# Patient Record
Sex: FEMALE | NOT HISPANIC OR LATINO | URBAN - METROPOLITAN AREA
[De-identification: names, ages, dates, MRNs, and addresses within clinical notes are randomized per-mention and may not be internally consistent; named-entity substitution may affect disease eponyms.]

---

## 2023-02-26 ENCOUNTER — OUTPATIENT (OUTPATIENT)
Dept: OUTPATIENT SERVICES | Facility: HOSPITAL | Age: 33
LOS: 1 days | End: 2023-02-26
Payer: COMMERCIAL

## 2023-02-26 DIAGNOSIS — Z11.52 ENCOUNTER FOR SCREENING FOR COVID-19: ICD-10-CM

## 2023-02-26 LAB — SARS-COV-2 RNA SPEC QL NAA+PROBE: SIGNIFICANT CHANGE UP

## 2023-02-26 PROCEDURE — C9803: CPT

## 2023-02-26 PROCEDURE — U0005: CPT

## 2023-02-26 PROCEDURE — U0003: CPT

## 2023-02-28 ENCOUNTER — INPATIENT (INPATIENT)
Facility: HOSPITAL | Age: 33
LOS: 2 days | Discharge: HOME | End: 2023-03-03
Attending: OBSTETRICS & GYNECOLOGY | Admitting: OBSTETRICS & GYNECOLOGY
Payer: COMMERCIAL

## 2023-02-28 VITALS
SYSTOLIC BLOOD PRESSURE: 135 MMHG | WEIGHT: 199.96 LBS | TEMPERATURE: 99 F | OXYGEN SATURATION: 97 % | HEART RATE: 82 BPM | DIASTOLIC BLOOD PRESSURE: 85 MMHG | RESPIRATION RATE: 17 BRPM | HEIGHT: 64 IN

## 2023-02-28 DIAGNOSIS — O48.0 POST-TERM PREGNANCY: ICD-10-CM

## 2023-02-28 LAB
BASOPHILS # BLD AUTO: 0.03 K/UL — SIGNIFICANT CHANGE UP (ref 0–0.2)
BASOPHILS NFR BLD AUTO: 0.3 % — SIGNIFICANT CHANGE UP (ref 0–2)
EOSINOPHIL # BLD AUTO: 0.09 K/UL — SIGNIFICANT CHANGE UP (ref 0–0.5)
EOSINOPHIL NFR BLD AUTO: 0.9 % — SIGNIFICANT CHANGE UP (ref 0–6)
HCT VFR BLD CALC: 40.2 % — SIGNIFICANT CHANGE UP (ref 34.5–45)
HGB BLD-MCNC: 13.6 G/DL — SIGNIFICANT CHANGE UP (ref 11.5–15.5)
IMM GRANULOCYTES NFR BLD AUTO: 0.4 % — SIGNIFICANT CHANGE UP (ref 0–0.9)
LYMPHOCYTES # BLD AUTO: 2.26 K/UL — SIGNIFICANT CHANGE UP (ref 1–3.3)
LYMPHOCYTES # BLD AUTO: 22 % — SIGNIFICANT CHANGE UP (ref 13–44)
MCHC RBC-ENTMCNC: 29.8 PG — SIGNIFICANT CHANGE UP (ref 27–34)
MCHC RBC-ENTMCNC: 33.8 GM/DL — SIGNIFICANT CHANGE UP (ref 32–36)
MCV RBC AUTO: 88.2 FL — SIGNIFICANT CHANGE UP (ref 80–100)
MONOCYTES # BLD AUTO: 0.68 K/UL — SIGNIFICANT CHANGE UP (ref 0–0.9)
MONOCYTES NFR BLD AUTO: 6.6 % — SIGNIFICANT CHANGE UP (ref 2–14)
NEUTROPHILS # BLD AUTO: 7.16 K/UL — SIGNIFICANT CHANGE UP (ref 1.8–7.4)
NEUTROPHILS NFR BLD AUTO: 69.8 % — SIGNIFICANT CHANGE UP (ref 43–77)
NRBC # BLD: 0 /100 WBCS — SIGNIFICANT CHANGE UP (ref 0–0)
PLATELET # BLD AUTO: 145 K/UL — LOW (ref 150–400)
RBC # BLD: 4.56 M/UL — SIGNIFICANT CHANGE UP (ref 3.8–5.2)
RBC # FLD: 13.1 % — SIGNIFICANT CHANGE UP (ref 10.3–14.5)
WBC # BLD: 10.26 K/UL — SIGNIFICANT CHANGE UP (ref 3.8–10.5)
WBC # FLD AUTO: 10.26 K/UL — SIGNIFICANT CHANGE UP (ref 3.8–10.5)

## 2023-02-28 RX ORDER — SODIUM CHLORIDE 9 MG/ML
1000 INJECTION, SOLUTION INTRAVENOUS
Refills: 0 | Status: DISCONTINUED | OUTPATIENT
Start: 2023-02-28 | End: 2023-03-01

## 2023-02-28 RX ORDER — CHLORHEXIDINE GLUCONATE 213 G/1000ML
1 SOLUTION TOPICAL ONCE
Refills: 0 | Status: DISCONTINUED | OUTPATIENT
Start: 2023-02-28 | End: 2023-03-01

## 2023-02-28 RX ORDER — OXYTOCIN 10 UNIT/ML
333.33 VIAL (ML) INJECTION
Qty: 20 | Refills: 0 | Status: DISCONTINUED | OUTPATIENT
Start: 2023-02-28 | End: 2023-03-03

## 2023-02-28 RX ORDER — CITRIC ACID/SODIUM CITRATE 300-500 MG
15 SOLUTION, ORAL ORAL EVERY 6 HOURS
Refills: 0 | Status: DISCONTINUED | OUTPATIENT
Start: 2023-02-28 | End: 2023-03-01

## 2023-02-28 RX ORDER — SODIUM CHLORIDE 9 MG/ML
1000 INJECTION, SOLUTION INTRAVENOUS ONCE
Refills: 0 | Status: COMPLETED | OUTPATIENT
Start: 2023-02-28 | End: 2023-02-28

## 2023-02-28 RX ADMIN — SODIUM CHLORIDE 125 MILLILITER(S): 9 INJECTION, SOLUTION INTRAVENOUS at 20:30

## 2023-02-28 RX ADMIN — SODIUM CHLORIDE 1000 MILLILITER(S): 9 INJECTION, SOLUTION INTRAVENOUS at 21:22

## 2023-02-28 NOTE — OB PROVIDER H&P - HISTORY OF PRESENT ILLNESS
Admission H&P    Subjective  HPI: yoF GP gestational age presents for _. +FM. -LOF. -CTXs. -VB. Pt denies any other concerns.    – PNC: denies prenatal issues. GBS _.  EFW _g by sono.    – OB Hx:  – GYN Hx:  denies abnormal pap smears, fibroids, polyps, ovarian cysts, PCOS, Endometriosis, STIs    – PMH: denies  – Meds: PNV   – Allergies: NKDA  – PSHx: denies  – Social: denies   – Will accept blood transfusions? Yes Admission H&P    Subjective  HPI: 32yoF  @41w0d gestational age presents for scheduled late term IOL. +FM. -LOF. -CTXs. -VB. Pt denies any other concerns.    – PNC: Uncomplicated. GBS negative. EFW 3700g by extrapolation from sono.  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 Denies abnormal pap smears, fibroids, polyps, ovarian cysts, PCOS, Endometriosis  – PMH: No significant medical history  – Meds: PNV   – Allergies: NKDA  – PSHx: No significant surgical history  – Social: denies alcohol, tobacco, recreational drug use; denies any hx of anxiety or depression

## 2023-02-28 NOTE — OB PROVIDER H&P - NSLOWPPHRISK_OBGYN_A_OB
No previous uterine incision/[place] Pregnancy/Less than or equal to 4 previous vaginal births/No known bleeding disorder/No history of postpartum hemorrhage/No other PPH risks indicated

## 2023-02-28 NOTE — OB PROVIDER H&P - NSHPPHYSICALEXAM_GEN_ALL_CORE
Objective    – Vital Signs  BP:   HR:   Temp:     – Physical exam  CV: extremities well perfused  Pulm: normal respiratory effort on room air  Abd: gravid, nontender  Extr: moving all extremities with ease    – VE: //  – Spec: pooling, nitrazine, ferning, bleeding  (lesions if patient with HSV2 history)    – FHT: baseline 1, mod variability, +accels, -decels  – [place]: qmin  – EFW: _g by sono  – Sono: vertex Objective    Vital Signs Last 24 Hrs  T(C): 37.2 (28 Feb 2023 18:16), Max: 37.2 (28 Feb 2023 18:08)  T(F): 98.96 (28 Feb 2023 18:16), Max: 99 (28 Feb 2023 18:08)  HR: 80 (28 Feb 2023 19:07) (70 - 82)  BP: 131/85 (28 Feb 2023 18:59) (131/85 - 135/85)  RR: 17 (28 Feb 2023 18:16) (17 - 17)  SpO2: 98% (28 Feb 2023 19:02) (97% - 98%)    Parameters below as of 28 Feb 2023 18:08  Patient On (Oxygen Delivery Method): room air    – Physical exam  CV: extremities well perfused  Pulm: normal respiratory effort on room air  Abd: gravid, nontender  Extr: moving all extremities with ease    – VE: //  - Speculum exam deferred as patient has never had an HSV outbreak    – FHT: baseline 120, mod variability, +accels, -decels  – [place]: irregular  – EFW: 3700g  – Sono: vertex Objective    Vital Signs Last 24 Hrs  T(C): 37.2 (28 Feb 2023 18:16), Max: 37.2 (28 Feb 2023 18:08)  T(F): 98.96 (28 Feb 2023 18:16), Max: 99 (28 Feb 2023 18:08)  HR: 80 (28 Feb 2023 19:07) (70 - 82)  BP: 131/85 (28 Feb 2023 18:59) (131/85 - 135/85)  RR: 17 (28 Feb 2023 18:16) (17 - 17)  SpO2: 98% (28 Feb 2023 19:02) (97% - 98%)    Parameters below as of 28 Feb 2023 18:08  Patient On (Oxygen Delivery Method): room air    – Physical exam  CV: extremities well perfused  Pulm: normal respiratory effort on room air  Abd: gravid, nontender  Extr: moving all extremities with ease    – VE: 1/60/-3  - Speculum exam deferred as patient has never had an HSV outbreak    – FHT: baseline 120, mod variability, +accels, -decels  – [place]: irregular  – EFW: 3700g  – Sono: vertex

## 2023-02-28 NOTE — OB PROVIDER H&P - ATTENDING COMMENTS
Pt for IOL for LT. Uncomplicated pregnancy. 1/60/-3    EFM + toco cat 1     Plan  PO cytotec  EFM + [place]  Epidural PRN    [email or web address]

## 2023-02-28 NOTE — OB RN PATIENT PROFILE - FALL HARM RISK - UNIVERSAL INTERVENTIONS
Bed in lowest position, wheels locked, appropriate side rails in place/Call bell, personal items and telephone in reach/Instruct patient to call for assistance before getting out of bed or chair/Non-slip footwear when patient is out of bed/[place] to call system/Physically safe environment - no spills, clutter or unnecessary equipment/Purposeful Proactive Rounding/Room/bathroom lighting operational, light cord in reach

## 2023-02-28 NOTE — OB PROVIDER H&P - ASSESSMENT
Assessment  – No prenatal issues. GBS _.    Plan  1. Admit to LND. Routine Labs. IVF.  2. Expectant management/IOL w/  3. Fetus: cat 1 tracing. VTX. EFW _g by sono. Continuous EFM. Sono. No concerns.  4. Prenatal issues: none  5. GBS _  6. Pain: IV pain meds/epidural PRN    Patient discussed with attending physician, Dr. Timur Lees PGY1 Assessment  31y/o  @41w0d admitted for scheduled late term IOL.  – No prenatal issues. GBS negative.    Plan  1. Admit to LND. Routine Labs. IVF.  2. IOL w/ PO Cytotec  3. Fetus: cat 1 tracing. VTX. EFW 3700g. Continuous EFM. Sono: cephalic presentation, no concerns.  4. Prenatal issues: none  5. GBS negative  6. Pain: epidural PRN    Patient discussed with attending physician, Dr. Tammie Lees PGY1

## 2023-02-28 NOTE — OB RN PATIENT PROFILE - THE IMPORTANCE OF INITIATING BREASTFEEDING WITHIN THE FIRST HOUR OF BIRTH.
This is a recent snapshot of the patient's [facility] medical record.  For current drug dose and complete information and questions, call [phone] or In Basket pool, [facility] ([id number])  CSN Number:  [id number]      
Statement Selected

## 2023-03-01 LAB
COVID-19 SPIKE DOMAIN AB INTERP: POSITIVE
COVID-19 SPIKE DOMAIN ANTIBODY RESULT: >250 U/ML — HIGH
SARS-COV-2 IGG+IGM SERPL QL IA: >250 U/ML — HIGH
SARS-COV-2 IGG+IGM SERPL QL IA: POSITIVE
T PALLIDUM AB TITR SER: NEGATIVE — SIGNIFICANT CHANGE UP

## 2023-03-01 RX ORDER — IBUPROFEN 200 MG
600 TABLET ORAL EVERY 6 HOURS
Refills: 0 | Status: COMPLETED | OUTPATIENT
Start: 2023-03-01 | End: 2024-01-28

## 2023-03-01 RX ORDER — KETOROLAC TROMETHAMINE 30 MG/ML
30 SYRINGE (ML) INJECTION ONCE
Refills: 0 | Status: DISCONTINUED | OUTPATIENT
Start: 2023-03-01 | End: 2023-03-01

## 2023-03-01 RX ORDER — SODIUM CHLORIDE 9 MG/ML
3 INJECTION INTRAMUSCULAR; INTRAVENOUS; SUBCUTANEOUS EVERY 8 HOURS
Refills: 0 | Status: DISCONTINUED | OUTPATIENT
Start: 2023-03-01 | End: 2023-03-03

## 2023-03-01 RX ORDER — ACETAMINOPHEN 500 MG
975 TABLET ORAL
Refills: 0 | Status: DISCONTINUED | OUTPATIENT
Start: 2023-03-01 | End: 2023-03-03

## 2023-03-01 RX ORDER — OXYCODONE HYDROCHLORIDE 5 MG/1
5 TABLET ORAL ONCE
Refills: 0 | Status: DISCONTINUED | OUTPATIENT
Start: 2023-03-01 | End: 2023-03-03

## 2023-03-01 RX ORDER — DIBUCAINE 1 %
1 OINTMENT (GRAM) RECTAL EVERY 6 HOURS
Refills: 0 | Status: DISCONTINUED | OUTPATIENT
Start: 2023-03-01 | End: 2023-03-03

## 2023-03-01 RX ORDER — MAGNESIUM HYDROXIDE 400 MG/1
30 TABLET, CHEWABLE ORAL
Refills: 0 | Status: DISCONTINUED | OUTPATIENT
Start: 2023-03-01 | End: 2023-03-03

## 2023-03-01 RX ORDER — PRAMOXINE HYDROCHLORIDE 150 MG/15G
1 AEROSOL, FOAM RECTAL EVERY 4 HOURS
Refills: 0 | Status: DISCONTINUED | OUTPATIENT
Start: 2023-03-01 | End: 2023-03-03

## 2023-03-01 RX ORDER — OXYTOCIN 10 UNIT/ML
2 VIAL (ML) INJECTION
Qty: 30 | Refills: 0 | Status: DISCONTINUED | OUTPATIENT
Start: 2023-03-01 | End: 2023-03-03

## 2023-03-01 RX ORDER — TETANUS TOXOID, REDUCED DIPHTHERIA TOXOID AND ACELLULAR PERTUSSIS VACCINE, ADSORBED 5; 2.5; 8; 8; 2.5 [IU]/.5ML; [IU]/.5ML; UG/.5ML; UG/.5ML; UG/.5ML
0.5 SUSPENSION INTRAMUSCULAR ONCE
Refills: 0 | Status: DISCONTINUED | OUTPATIENT
Start: 2023-03-01 | End: 2023-03-03

## 2023-03-01 RX ORDER — AER TRAVELER 0.5 G/1
1 SOLUTION RECTAL; TOPICAL EVERY 4 HOURS
Refills: 0 | Status: DISCONTINUED | OUTPATIENT
Start: 2023-03-01 | End: 2023-03-03

## 2023-03-01 RX ORDER — SIMETHICONE 80 MG/1
80 TABLET, CHEWABLE ORAL EVERY 4 HOURS
Refills: 0 | Status: DISCONTINUED | OUTPATIENT
Start: 2023-03-01 | End: 2023-03-03

## 2023-03-01 RX ORDER — OXYTOCIN 10 UNIT/ML
41.67 VIAL (ML) INJECTION
Qty: 20 | Refills: 0 | Status: DISCONTINUED | OUTPATIENT
Start: 2023-03-01 | End: 2023-03-03

## 2023-03-01 RX ORDER — DIPHENHYDRAMINE HCL 50 MG
25 CAPSULE ORAL EVERY 6 HOURS
Refills: 0 | Status: DISCONTINUED | OUTPATIENT
Start: 2023-03-01 | End: 2023-03-03

## 2023-03-01 RX ORDER — LANOLIN
1 OINTMENT (GRAM) TOPICAL EVERY 6 HOURS
Refills: 0 | Status: DISCONTINUED | OUTPATIENT
Start: 2023-03-01 | End: 2023-03-03

## 2023-03-01 RX ORDER — HYDROCORTISONE 1 %
1 OINTMENT (GRAM) TOPICAL EVERY 6 HOURS
Refills: 0 | Status: DISCONTINUED | OUTPATIENT
Start: 2023-03-01 | End: 2023-03-03

## 2023-03-01 RX ORDER — BENZOCAINE 10 %
1 GEL (GRAM) MUCOUS MEMBRANE EVERY 6 HOURS
Refills: 0 | Status: DISCONTINUED | OUTPATIENT
Start: 2023-03-01 | End: 2023-03-03

## 2023-03-01 RX ORDER — OXYCODONE HYDROCHLORIDE 5 MG/1
5 TABLET ORAL
Refills: 0 | Status: DISCONTINUED | OUTPATIENT
Start: 2023-03-01 | End: 2023-03-03

## 2023-03-01 RX ORDER — CEFAZOLIN SODIUM 1 G
2000 VIAL (EA) INJECTION EVERY 8 HOURS
Refills: 0 | Status: COMPLETED | OUTPATIENT
Start: 2023-03-01 | End: 2023-03-02

## 2023-03-01 RX ADMIN — SODIUM CHLORIDE 125 MILLILITER(S): 9 INJECTION, SOLUTION INTRAVENOUS at 05:02

## 2023-03-01 RX ADMIN — Medication 100 MILLIGRAM(S): at 22:45

## 2023-03-01 RX ADMIN — Medication 2 MILLIUNIT(S)/MIN: at 11:35

## 2023-03-01 RX ADMIN — Medication 0.25 MILLIGRAM(S): at 10:10

## 2023-03-01 RX ADMIN — Medication 0.25 MILLIGRAM(S): at 10:13

## 2023-03-01 RX ADMIN — SODIUM CHLORIDE 125 MILLILITER(S): 9 INJECTION, SOLUTION INTRAVENOUS at 04:00

## 2023-03-01 RX ADMIN — Medication 125 MILLIUNIT(S)/MIN: at 21:57

## 2023-03-01 RX ADMIN — Medication 30 MILLIGRAM(S): at 21:44

## 2023-03-01 NOTE — CHART NOTE - NSCHARTNOTEFT_GEN_A_CORE
MATILDA MCGINNIS Note:    Pt seen and examined for prolonged fetal decel w/ robby to 70's.  Pt found comfortable w / epidural. No urge to push or rectal pressure. Pt reports possible SROM clear fluid recently. Pt had received oral cytotec at 8:50am.  Abdomen palpated w/ tetanic contraction. Terb 0.25 mg given.  VE revealed to be 5/90/-3 w/ SROM clear.  Pt repositioned to hands/knees and FHR persistently in 90's although audible return to baseline for 5-10 seconds before deceling again. Another dose of terb 0.25 mg given subq. At this point, pt transferred to opened OR with Dr. Gonzales to assess need for emergent . After pt transferred to table, FH noted to be 120-130's. Dr Lopez arrived to OR. After counseling, pt decided she would try to continue laboring. TRansfer back to LDR 10.     RAHEL Guillen
Pt re-evaluated post decel, VE 8/90/-1. FHRT with good recovery, moderate variability.   - Will c/w current managment

## 2023-03-01 NOTE — OB PROVIDER DELIVERY SUMMARY - NSPROVIDERDELIVERYNOTE_OBGYN_ALL_OB_FT
Patient became fully dilated and began to push. Patient with Cat II tracing, decision made for vacuum-assisted delivery. RML performed by , Dr. Lacey. Placed without issue, with 1 pull and no pop-offs spontaneous vaginal delivery of liveborn male  from OA position. Head, shoulders, and body delivered easily. Infant was suctioned. Terminal mec. Delayed cord clamping and infant was passed to mother. Cord clamped and cut. Placenta delivered intact with a 3 vessel cord. Fundal massage was given and uterine fundus was found to be firm. Vaginal exam revealed an intact cervix, vaginal walls. Patient had a large R sulcal and 2nd degree laceration in the perineum that was repaired with 2.0 and 3.0 vicryl suture. Excellent hemostasis was noted. Patient was stable and went to recovery. Count was correct x 2.    Malaika Starkey PGY4 Patient became fully dilated and began to push. Patient with Cat II tracing, decision made for vacuum-assisted delivery. RML performed by , Dr. Lacey. Placed without issue, with 1 pull and no pop-offs spontaneous vaginal delivery of liveborn male  from OA position. Head, shoulders, and body delivered easily. Infant was suctioned. Terminal mec. Delayed cord clamping and infant was passed to mother. Cord clamped and cut. Placenta delivered intact with a 3 vessel cord. Fundal massage was given, CytoPR placed for uterine atony. Uterus then noted was found to be firm. Vaginal exam revealed an intact cervix, vaginal walls. Patient had a large R sulcal and 2nd degree laceration in the perineum that was repaired with 2.0 and 3.0 vicryl suture. VPx2 placed with alvarez catheter 2/2 deep sulcal. No hematoma noted at time of placement. Excellent hemostasis was noted. Patient was stable and went to recovery. Count was correct x 2.    Malaika Starkey PGY4

## 2023-03-01 NOTE — OB PROVIDER DELIVERY SUMMARY - NSSELHIDDEN_OBGYN_ALL_OB_FT
[NS_DeliveryAttending1_OBGYN_ALL_OB_FT:[id number]],[NS_DeliveryAssist1_OBGYN_ALL_OB_FT:[id number]],[NS_DeliveryAttending2_OBGYN_ALL_OB_FT:[id number]],[NS_DeliveryRN_OBGYN_ALL_OB_FT:[id number]]

## 2023-03-01 NOTE — OB NEONATOLOGY/PEDIATRICIAN DELIVERY SUMMARY - NSPEDSNEONOTESA_OBGYN_ALL_OB_FT
Called by OBGYN to attend induced vaginal delivery due to vacuum-assistance. Baby is product of a 41.1 week gestation born to a  32 y.o. F. Maternal labs include Blood Type O-, GBS-, Hep B-, RPR-, Rubella imm, HIV-, Covid-. Maternal history is significant for HSV (on Valtrex, no outbreaks). Pregnancy was uncomplicated. SROM on 3/1/23 at 1005 with clear fluid, at approximately 10 hours, though terminal meconiun at delivery. Delivery complicated by need for vacuum-assistance. Baby emerged crying and vigorous. Delayed cord clamping x60s performed. Resuscitation included w/d/s/s. Apgars were 8/9. EOS score: 0.20. Feeding: Breast exclusively. Consents to hep B. Consents to circumcision. Admit to NBN.

## 2023-03-01 NOTE — OB RN DELIVERY SUMMARY - NSSELHIDDEN_OBGYN_ALL_OB_FT
[NS_DeliveryAttending1_OBGYN_ALL_OB_FT:[id number]],[NS_DeliveryAssist1_OBGYN_ALL_OB_FT:[id number]],[NS_DeliveryAttending2_OBGYN_ALL_OB_FT:[id number]],[NS_DeliveryRN_OBGYN_ALL_OB_FT:[id number]]

## 2023-03-01 NOTE — OB RN DELIVERY SUMMARY - NS_SEPSISRSKCALC_OBGYN_ALL_OB_FT
EOS calculated successfully. EOS Risk Factor: 0.5/1000 live births ([facility] national incidence); GA=41w1d; Temp=99; ROM=9.933; GBS='Negative'; Antibiotics='No antibiotics or any antibiotics < 2 hrs prior to birth'

## 2023-03-02 RX ORDER — IBUPROFEN 200 MG
600 TABLET ORAL EVERY 6 HOURS
Refills: 0 | Status: DISCONTINUED | OUTPATIENT
Start: 2023-03-02 | End: 2023-03-03

## 2023-03-02 RX ADMIN — Medication 600 MILLIGRAM(S): at 21:50

## 2023-03-02 RX ADMIN — Medication 600 MILLIGRAM(S): at 10:20

## 2023-03-02 RX ADMIN — Medication 975 MILLIGRAM(S): at 18:16

## 2023-03-02 RX ADMIN — Medication 975 MILLIGRAM(S): at 23:29

## 2023-03-02 RX ADMIN — Medication 600 MILLIGRAM(S): at 21:00

## 2023-03-02 RX ADMIN — SODIUM CHLORIDE 3 MILLILITER(S): 9 INJECTION INTRAMUSCULAR; INTRAVENOUS; SUBCUTANEOUS at 05:16

## 2023-03-02 RX ADMIN — Medication 975 MILLIGRAM(S): at 13:05

## 2023-03-02 RX ADMIN — Medication 600 MILLIGRAM(S): at 15:53

## 2023-03-02 RX ADMIN — Medication 600 MILLIGRAM(S): at 16:40

## 2023-03-02 RX ADMIN — Medication 975 MILLIGRAM(S): at 12:22

## 2023-03-02 RX ADMIN — Medication 600 MILLIGRAM(S): at 04:15

## 2023-03-02 RX ADMIN — SODIUM CHLORIDE 3 MILLILITER(S): 9 INJECTION INTRAMUSCULAR; INTRAVENOUS; SUBCUTANEOUS at 15:00

## 2023-03-02 RX ADMIN — Medication 100 MILLIGRAM(S): at 05:46

## 2023-03-02 RX ADMIN — Medication 600 MILLIGRAM(S): at 03:40

## 2023-03-02 RX ADMIN — Medication 975 MILLIGRAM(S): at 00:20

## 2023-03-02 RX ADMIN — Medication 600 MILLIGRAM(S): at 09:28

## 2023-03-02 RX ADMIN — Medication 100 MILLIGRAM(S): at 14:02

## 2023-03-02 RX ADMIN — Medication 975 MILLIGRAM(S): at 00:51

## 2023-03-02 RX ADMIN — Medication 975 MILLIGRAM(S): at 18:58

## 2023-03-02 RX ADMIN — Medication 975 MILLIGRAM(S): at 05:46

## 2023-03-03 ENCOUNTER — TRANSCRIPTION ENCOUNTER (OUTPATIENT)
Age: 33
End: 2023-03-03

## 2023-03-03 VITALS — HEIGHT: 64 IN | WEIGHT: 199.96 LBS

## 2023-03-03 PROCEDURE — 85025 COMPLETE CBC W/AUTO DIFF WBC: CPT

## 2023-03-03 PROCEDURE — 86850 RBC ANTIBODY SCREEN: CPT

## 2023-03-03 PROCEDURE — 36415 COLL VENOUS BLD VENIPUNCTURE: CPT

## 2023-03-03 PROCEDURE — 86900 BLOOD TYPING SEROLOGIC ABO: CPT

## 2023-03-03 PROCEDURE — 86901 BLOOD TYPING SEROLOGIC RH(D): CPT

## 2023-03-03 PROCEDURE — 86769 SARS-COV-2 COVID-19 ANTIBODY: CPT

## 2023-03-03 PROCEDURE — 86780 TREPONEMA PALLIDUM: CPT

## 2023-03-03 PROCEDURE — 59050 FETAL MONITOR W/REPORT: CPT

## 2023-03-03 RX ORDER — ACETAMINOPHEN 500 MG
3 TABLET ORAL
Qty: 0 | Refills: 0 | DISCHARGE
Start: 2023-03-03

## 2023-03-03 RX ORDER — IBUPROFEN 200 MG
1 TABLET ORAL
Qty: 0 | Refills: 0 | DISCHARGE
Start: 2023-03-03

## 2023-03-03 RX ADMIN — Medication 975 MILLIGRAM(S): at 00:10

## 2023-03-03 RX ADMIN — Medication 975 MILLIGRAM(S): at 06:36

## 2023-03-03 RX ADMIN — Medication 975 MILLIGRAM(S): at 12:30

## 2023-03-03 RX ADMIN — Medication 975 MILLIGRAM(S): at 11:53

## 2023-03-03 RX ADMIN — Medication 975 MILLIGRAM(S): at 05:52

## 2023-03-03 NOTE — DISCHARGE NOTE OB - MEDICATION SUMMARY - MEDICATIONS TO TAKE
I will START or STAY ON the medications listed below when I get home from the hospital:    acetaminophen 325 mg oral tablet  -- 3 tab(s) by mouth every 6 hours  -- Indication: For Pain    ibuprofen 600 mg oral tablet  -- 1 tab(s) by mouth every 6 hours  -- Indication: For Pain    Prenatal Multivitamins with Folic Acid 1 mg oral tablet  -- 1 tab(s) by mouth once a day  -- Indication: For Health

## 2023-03-03 NOTE — PROGRESS NOTE ADULT - ATTENDING COMMENTS
Pt evaluated at bedside. She is s/p a VAVD. Pt feeling well without complaints. Denies HA, lightheadedness, CP, SOB, palpitations, abdominal pain, heavy vaginal bleeding.     T(C): 36.4 (03-03-23 @ 06:14), Max: 36.8 (03-02-23 @ 16:55)  HR: 67 (03-03-23 @ 06:14) (59 - 67)  BP: 125/84 (03-03-23 @ 06:14) (122/80 - 125/84)  RR: 18 (03-03-23 @ 06:14) (18 - 18)  SpO2: 100% (03-03-23 @ 06:14) (98% - 100%)    Physical exam:   Abd: Soft, NT, ND, fundus firm and well contracted   VE: Appropriate vaginal bleeding    Plan  -Continue routine post partum care  -Agree with above plan    Jeronimo
Agree with assessment and plan. Pt doing well without complaints.   Vitals stable. Exam Benign  - Routine post partum care   - vag packing and alvarez removed.

## 2023-03-03 NOTE — DISCHARGE NOTE OB - BREASTFEEDING PROVIDES MATERNAL HEALTH BENEFITS, DECREASED PREMENOPAUSAL BREAST CANCER, OVARIAN CANCER AND TYPE II DIABETES MELLITUS
-- DO NOT REPLY / DO NOT REPLY ALL --  -- Message is from [facility] [employer] ([facility]) --    General Patient Message     Patients is requesting higher dose for below medication , per patiens 250 gm is not working and she did sent message to her PCP.    metroNIDAZOLE (FLAGYL) 250 MG tablet 250 mg, Oral, 2 TIMES DAILY         Caller Information       Type Contact Phone/Fax    10/08/2022 02:25 PM CDT Phone (Incoming) Lorena Ponce (Self) [phone] (M)        Alternative phone number: none    Can a detailed message be left? Yes    Message Turnaround:     Is it Working Hours? No - After Hours/Weekend/Holiday     Did the caller agree that this message can wait until the office reopens in the morning? NO - The Message Cannot Wait Until Morning      Select the reason for this message: Medication Refill      STOP - Complete a Refill Medication encounter               Statement Selected

## 2023-03-03 NOTE — DISCHARGE NOTE OB - MATERIALS PROVIDED
Vaccinations/[facility]  Screening Program/  Immunization Record/Breastfeeding Log/Breastfeeding Mother’s Support Group Information/Guide to Postpartum Care/[facility] Hearing Screen Program/Back To Sleep Handout/Shaken Baby Prevention Handout/Breastfeeding Guide and Packet/Birth Certificate Instructions/Discharge Medication Information for Patients and Families Pocket Guide

## 2023-03-03 NOTE — DISCHARGE NOTE OB - CARE PLAN
Principal Discharge DX:	Vacuum-assisted vaginal delivery  Assessment and plan of treatment:	After discharge, please stay on pelvic rest for 6 weeks, meaning no sexual intercourse, no tampons and no douching.  No driving for 2 weeks as women can loose a lot of blood during delivery and there is a possibility of being lightheaded/fainting.  No lifting objects heavier than baby for two weeks.  Expect to have vaginal bleeding/spotting for up to six weeks.  The bleeding should get lighter and more white/light brown with time.  For bleeding soaking more than a pad an hour or passing clots greater than the size of your fist, come in to the emergency department.    Follow up in the office in 6 weeks     You can take up to 600mg Ibuprofen every 6 hours and/or tylenol 975mg every 6 hours. Alternate medications every 3 hours (Take Ibuprofen in the morning and then tylenol 3 hours later)   1

## 2023-03-03 NOTE — DISCHARGE NOTE OB - PATIENT PORTAL LINK FT
You can access the FollowMyHealth Patient Portal offered by [facility] by registering at the following website: [email or web address]. By joining [employer]’s FollowMyHealth portal, you will also be able to view your health information using other applications (apps) compatible with our system.

## 2023-03-03 NOTE — PROGRESS NOTE ADULT - SUBJECTIVE AND OBJECTIVE BOX
R1 Progress Note    Patient seen and examined at bedside, no acute overnight events. No acute complaints, pain well controlled. Patient is ambulating, and tolerating regular diet. Gonzalez still in place, patient passing flatus.  Reports vaginal bleeding decreasing. Denies CP, SOB, N/V, HA, blurred vision, lightheadedness, dizziness.    Vital Signs Last 24 Hours  T(C): 36.6 (03-02-23 @ 05:41), Max: 37.3 (03-01-23 @ 20:55)  HR: 56 (03-02-23 @ 05:41) (49 - 106)  BP: 122/80 (03-02-23 @ 05:41) (105/65 - 151/80)  RR: 18 (03-02-23 @ 05:41) (16 - 18)  SpO2: 96% (03-02-23 @ 05:41) (92% - 100%)    Physical Exam:  General: NAD  Abdomen: Soft, appropriately tender, non-distended, fundus firm  Pelvic: Lochia wnl,  in place    Labs:    Blood Type: O Negative  Antibody Screen: Negative  RPR: Negative               13.6   10.26 )-----------( 145      ( 02-28 @ 20:59 )             40.2         MEDICATIONS  (STANDING):  acetaminophen     Tablet .. 975 milliGRAM(s) Oral <User Schedule>  ceFAZolin   IVPB 2000 milliGRAM(s) IV Intermittent every 8 hours  diphtheria/tetanus/pertussis (acellular) Vaccine (Adacel) 0.5 milliLiter(s) IntraMuscular once  ibuprofen  Tablet. 600 milliGRAM(s) Oral every 6 hours  oxytocin Infusion 333.333 milliUNIT(s)/Min (1000 mL/Hr) IV Continuous <Continuous>  oxytocin Infusion 41.667 milliUNIT(s)/Min (125 mL/Hr) IV Continuous <Continuous>  oxytocin Infusion. 2 milliUNIT(s)/Min (2 mL/Hr) IV Continuous <Continuous>  prenatal multivitamin 1 Tablet(s) Oral daily  sodium chloride 0.9% lock flush 3 milliLiter(s) IV Push every 8 hours    MEDICATIONS  (PRN):  benzocaine 20%/menthol 0.5% Spray 1 Spray(s) Topical every 6 hours PRN for Perineal discomfort  dibucaine 1% Ointment 1 Application(s) Topical every 6 hours PRN Perineal discomfort  diphenhydrAMINE 25 milliGRAM(s) Oral every 6 hours PRN Pruritus  hydrocortisone 1% Cream 1 Application(s) Topical every 6 hours PRN Moderate Pain (4-6)  lanolin Ointment 1 Application(s) Topical every 6 hours PRN nipple soreness  magnesium hydroxide Suspension 30 milliLiter(s) Oral two times a day PRN Constipation  oxyCODONE    IR 5 milliGRAM(s) Oral every 3 hours PRN Moderate to Severe Pain (4-10)  oxyCODONE    IR 5 milliGRAM(s) Oral once PRN Moderate to Severe Pain (4-10)  pramoxine 1%/zinc 5% Cream 1 Application(s) Topical every 4 hours PRN Moderate Pain (4-6)  simethicone 80 milliGRAM(s) Chew every 4 hours PRN Gas  witch hazel Pads 1 Application(s) Topical every 4 hours PRN Perineal discomfort  
R1 Progress Note    Patient seen and examined at bedside, no acute overnight events. No acute complaints, pain well controlled. Patient is ambulating, voiding spontaneously, passing gas, and tolerating regular diet. Reports vaginal bleeding decreasing. Denies CP, SOB, N/V, HA, blurred vision, lightheadedness, dizziness.    Vital Signs Last 24 Hours  T(C): 36.4 (03-03-23 @ 06:14), Max: 36.8 (03-02-23 @ 16:55)  HR: 67 (03-03-23 @ 06:14) (59 - 67)  BP: 125/84 (03-03-23 @ 06:14) (119/79 - 125/84)  RR: 18 (03-03-23 @ 06:14) (18 - 18)  SpO2: 100% (03-03-23 @ 06:14) (98% - 100%)    Physical Exam:  General: NAD  Abdomen: Soft, appropriately tender, non-distended, fundus firm  Pelvic: Lochia wnl    Labs:    Blood Type: O Negative  Antibody Screen: Negative  RPR: Negative               13.6   10.26 )-----------( 145      ( 02-28 @ 20:59 )             40.2         MEDICATIONS  (STANDING):  acetaminophen     Tablet .. 975 milliGRAM(s) Oral <User Schedule>  diphtheria/tetanus/pertussis (acellular) Vaccine (Adacel) 0.5 milliLiter(s) IntraMuscular once  ibuprofen  Tablet. 600 milliGRAM(s) Oral every 6 hours  oxytocin Infusion 333.333 milliUNIT(s)/Min (1000 mL/Hr) IV Continuous <Continuous>  oxytocin Infusion 41.667 milliUNIT(s)/Min (125 mL/Hr) IV Continuous <Continuous>  oxytocin Infusion. 2 milliUNIT(s)/Min (2 mL/Hr) IV Continuous <Continuous>  prenatal multivitamin 1 Tablet(s) Oral daily  sodium chloride 0.9% lock flush 3 milliLiter(s) IV Push every 8 hours    MEDICATIONS  (PRN):  benzocaine 20%/menthol 0.5% Spray 1 Spray(s) Topical every 6 hours PRN for Perineal discomfort  dibucaine 1% Ointment 1 Application(s) Topical every 6 hours PRN Perineal discomfort  diphenhydrAMINE 25 milliGRAM(s) Oral every 6 hours PRN Pruritus  hydrocortisone 1% Cream 1 Application(s) Topical every 6 hours PRN Moderate Pain (4-6)  lanolin Ointment 1 Application(s) Topical every 6 hours PRN nipple soreness  magnesium hydroxide Suspension 30 milliLiter(s) Oral two times a day PRN Constipation  oxyCODONE    IR 5 milliGRAM(s) Oral every 3 hours PRN Moderate to Severe Pain (4-10)  oxyCODONE    IR 5 milliGRAM(s) Oral once PRN Moderate to Severe Pain (4-10)  pramoxine 1%/zinc 5% Cream 1 Application(s) Topical every 4 hours PRN Moderate Pain (4-6)  simethicone 80 milliGRAM(s) Chew every 4 hours PRN Gas  witch hazel Pads 1 Application(s) Topical every 4 hours PRN Perineal discomfort

## 2023-03-03 NOTE — PROGRESS NOTE ADULT - ASSESSMENT
33[facility]  [facility] .  Vaginal packing and alvarez still in place.  Patient stable and progressing appropriately postpartum.    #Postpartum  - Continue with po analgesia  - Increase ambulation  - Continue regular diet  - Ancef x24h  - s/p Cytotec [place]  -  and Alvarez to be removed by Attending Physician    Toña Chiu, PGY1
31y/o  PPD#2 from VAVD, c/b sulcus laceration, .  Patient stable and progressing appropriately postpartum.    #Postpartum  - Continue with po analgesia  - Increase ambulation  - Continue regular diet  - s/p Ancef x24h  - s/p Cytotec [place]  - s/p  and Gonzalez, now voiding spontaneously with decreasing vaginal bleeding    Toña Chiu, PGY1

## 2023-03-03 NOTE — DISCHARGE NOTE OB - HOSPITAL COURSE
Patient had a vacuum assisted vaginal delivery.  Please see delivery note for details.  During postpartum course patient's vitals were stable, vaginal bleeding appropriate, and pain well controlled.  On day of discharge patient was ambulating, her pain controlled with oral medications, had adequate oral intake, and was voiding freely.  Discharge instructions and precautions were given.  Will return to the office in 6 weeks for postpartum visit.

## 2023-03-03 NOTE — DISCHARGE NOTE OB - CARE PROVIDER_API CALL
Leighton Cho (MD)  Obstetrics and Gynecology Surgery  [address], [address]  [place] [address]  Phone: [phone]  Fax: [phone]  Follow Up Time:

## 2023-05-25 NOTE — OB RN PATIENT PROFILE - NS_DATEOFLASTVISIT_OBGYN_ALL_OB_DT
ADHD FOLLOW-UP VISIT    SUBJECTIVE:    Chief Complaint   Patient presents with   • Office Visit   • Follow-up     ADHD     Historian: mother No  needed.    History of Present Illness:    Mata Mo is a 11 year old male who presents for ADHD follow-up with monitoring of medication.    School performance:  Patient does not currently have an IEP. Patient with improvement in all grades overall including the grades he was having the most difficulty with all year.    - English LA/Reading: denies issues  - Math: denies issues  - Science/Social Studies: denies issues  - Homework: denies issues  - Behavior with peers/teacher: denies issues.   - Absences: none reported  - Bullying: none reported  - Therapies: none reported  - Extracurricular/sports: denies issues    Home performance:  - Behavior with parent/sibling: denies issues  - Homework: denies issues  - Sleep: denies issues    Medication surveillance:   - Currently on medication.  - Parent/patient denies the following symptoms related to medication: stomaches, change in appetite, trouble sleeping, irritability, decreased social interaction, tired/listless behavior, tremors, tics/jerking, and hallucinations.  - Patient endorses headaches which is new. It has only occurred a couple times. One requiring a nurse visit at school which mom was not aware about. He has not missed school because of it or taken medicine.   - Patient is involved in three sports outside of school and so patient has lost some weight however mom thinks that this may be related to that as well    Review of Systems:   A 14 point ROS was performed with the patient/parent including: General, HEENT, CV, Respiratory, GI, , Psychiatric, Lymphatic, Immunology, Integumentary, Hematologic, Endocrine, Musculoskeletal and Neurologic; and are negative except that stated above.    History:  Allergies, Medications, Medical HX, Past Medical/Surgical HX, Family HX, Social HX reviewed and  updated.  ALLERGIES:  No Known Allergies  Current Outpatient Medications   Medication Sig Dispense Refill   • methylpheniDATE (METADATE ER) 20 MG ER tablet Take 1 tablet by mouth every morning. 30 tablet 0   • senna (SENOKOT) 8.6 MG tablet Take 1 tablet by mouth daily.       No current facility-administered medications for this visit.        OBJECTIVE:    Physical exam:  Vitals:    05/25/23 1633   BP: 107/77   BP Location: RUE - Right upper extremity   Patient Position: Sitting   Cuff Size: Small Adult   Pulse: 96   Resp: 28   Temp: 97.5 °F (36.4 °C)   TempSrc: Temporal   SpO2: 100%   Weight: 36.1 kg (79 lb 9.4 oz)   Height: 4' 6.33\" (1.38 m)     Body mass index is 18.96 kg/m². 77 %ile (Z= 0.74) based on CDC (Boys, 2-20 Years) BMI-for-age based on BMI available as of 5/25/2023.    General: Alert. NAD. NT.  Head: Normocephalic. Atraumatic.  Eyes: EOMI. PERRLA. Normal conjunctiva. + Red reflex.  Ears: Bilateral: Normal intact tympanic membranes.   Nose: Patent. Normal turbinates.  Throat: Moist mucosa. No tonsillar enlargement or erythema.  Neck: Soft. Supple. No masses.  Cardiovascular: RRR. S1 S2. No murmurs, gallops or thrills. Capillary refill <3 seconds.  Respiratory: Symmetric chest expansion. CTA. No wheezing, rales, or rhonchi.   Abdomen: Soft. NT. ND. + BS. No masses. No heptosplenomegaly.  Skin: No rashes or lesions. Dry, intact.  Musculoskeletal: Bilateral UE and LE: No deformities. Normal tone. Normal strength. FROM of bilateral upper and lower extremities.  Neurologic: CN II-XII intact. Tongue protruded in midline and showed no atrophy, tremor or fasciculations. No focal deficits. Finger-finger, finger-nose was unremarkable. Erect posture was normal. There was no postural instability. Normal gait. he had no proximal muscle weakness in LE/UE. Normal muscle bulk throughout.  Psychiatric: Cooperative. Speech was fluent and comprehension was intact. Patient able to follow direct commands.    Diagnostic  Data:  Reviewed chart  Reviewed [facility] website    ASSESSMENT:  Mata Mo is a 11 year old male who presents for ADHD evaluation. The patient diagnosed with ADHD (attention deficit hyperactivity disorder), combined type  (primary encounter diagnosis). Negative screening for co-morbid conditions. No contraindications to medications. Patient without preexisting severe gastrointestinal narrowing or cardiac pathology. Patient is not taking anticoagulants, anticonvulsants, phenylbutazone, and tricyclic antidepressants.     PLAN:  - Counseled parent on condition  - All parental concerns and questions discussed  - Follow-up [place] forms for parent/teacher at next visit   - Anticipatory guidance given including: developmental, nutrition and activity  - Medications per orders; ordered early because patient is traveling to [place] tomorrow  - Discussed options of psychotherapy  - Discussed behavioral techniques such as positive reinforcement and token economy   - Goal for next follow up appointment: monitor weight and symptoms  - Return to office for 3 months; sooner if any concerns    Orders Placed This Encounter   • methylpheniDATE (METADATE ER) 20 MG ER tablet        Wendy Amaya MD  Physician - Pediatrics  Approved Bilingual Team Member - Mariel    24-Feb-2023

## 2023-09-12 ENCOUNTER — RESULT REVIEW (OUTPATIENT)
Age: 33
End: 2023-09-12

## 2023-09-12 ENCOUNTER — APPOINTMENT (OUTPATIENT)
Dept: ULTRASOUND IMAGING | Facility: CLINIC | Age: 33
End: 2023-09-12
Payer: COMMERCIAL

## 2023-09-12 PROCEDURE — 76801 OB US < 14 WKS SINGLE FETUS: CPT

## 2023-09-12 PROCEDURE — 76817 TRANSVAGINAL US OBSTETRIC: CPT

## 2023-09-15 PROBLEM — Z00.00 ENCOUNTER FOR PREVENTIVE HEALTH EXAMINATION: Status: ACTIVE | Noted: 2023-09-15

## 2023-09-21 ENCOUNTER — RESULT REVIEW (OUTPATIENT)
Age: 33
End: 2023-09-21

## 2023-09-21 ENCOUNTER — APPOINTMENT (OUTPATIENT)
Dept: ULTRASOUND IMAGING | Facility: CLINIC | Age: 33
End: 2023-09-21
Payer: COMMERCIAL

## 2023-09-21 PROCEDURE — 76817 TRANSVAGINAL US OBSTETRIC: CPT

## 2023-09-21 PROCEDURE — 76801 OB US < 14 WKS SINGLE FETUS: CPT

## 2023-10-17 ENCOUNTER — RESULT REVIEW (OUTPATIENT)
Age: 33
End: 2023-10-17

## 2024-05-28 NOTE — OB RN PATIENT PROFILE - ANESTHESIA, PREVIOUS REACTION, PROFILE
Hospital security notified that pt is discharged  
Perri ROMAN at bedside. Pt in Police custody. Hospital security to be notified to escort pt out upon discharge.   
Pt given urinal. Attempted to urinate with officer at bedside. States he was unable to provide urinal. Will attempt again   
none

## 2025-04-09 ENCOUNTER — INPATIENT (INPATIENT)
Facility: HOSPITAL | Age: 35
LOS: 1 days | Discharge: HOME | DRG: 833 | End: 2025-04-11
Attending: OBSTETRICS & GYNECOLOGY | Admitting: OBSTETRICS & GYNECOLOGY
Payer: COMMERCIAL

## 2025-04-09 VITALS
DIASTOLIC BLOOD PRESSURE: 70 MMHG | RESPIRATION RATE: 18 BRPM | TEMPERATURE: 98 F | SYSTOLIC BLOOD PRESSURE: 146 MMHG | HEART RATE: 86 BPM

## 2025-04-09 DIAGNOSIS — O30.043 TWIN PREGNANCY, DICHORIONIC/DIAMNIOTIC, THIRD TRIMESTER: ICD-10-CM

## 2025-04-09 LAB
BASOPHILS # BLD AUTO: 0.03 K/UL — SIGNIFICANT CHANGE UP (ref 0–0.2)
BASOPHILS NFR BLD AUTO: 0.3 % — SIGNIFICANT CHANGE UP (ref 0–2)
EOSINOPHIL # BLD AUTO: 0.07 K/UL — SIGNIFICANT CHANGE UP (ref 0–0.5)
EOSINOPHIL NFR BLD AUTO: 0.7 % — SIGNIFICANT CHANGE UP (ref 0–6)
HCT VFR BLD CALC: 38 % — SIGNIFICANT CHANGE UP (ref 34.5–45)
HGB BLD-MCNC: 12.9 G/DL — SIGNIFICANT CHANGE UP (ref 11.5–15.5)
IMM GRANULOCYTES NFR BLD AUTO: 0.4 % — SIGNIFICANT CHANGE UP (ref 0–0.9)
LYMPHOCYTES # BLD AUTO: 2.4 K/UL — SIGNIFICANT CHANGE UP (ref 1–3.3)
LYMPHOCYTES # BLD AUTO: 24.4 % — SIGNIFICANT CHANGE UP (ref 13–44)
MCHC RBC-ENTMCNC: 30 PG — SIGNIFICANT CHANGE UP (ref 27–34)
MCHC RBC-ENTMCNC: 33.9 G/DL — SIGNIFICANT CHANGE UP (ref 32–36)
MCV RBC AUTO: 88.4 FL — SIGNIFICANT CHANGE UP (ref 80–100)
MONOCYTES # BLD AUTO: 0.65 K/UL — SIGNIFICANT CHANGE UP (ref 0–0.9)
MONOCYTES NFR BLD AUTO: 6.6 % — SIGNIFICANT CHANGE UP (ref 2–14)
NEUTROPHILS # BLD AUTO: 6.64 K/UL — SIGNIFICANT CHANGE UP (ref 1.8–7.4)
NEUTROPHILS NFR BLD AUTO: 67.6 % — SIGNIFICANT CHANGE UP (ref 43–77)
NRBC BLD AUTO-RTO: 0 /100 WBCS — SIGNIFICANT CHANGE UP (ref 0–0)
PLATELET # BLD AUTO: 144 K/UL — LOW (ref 150–400)
RBC # BLD: 4.3 M/UL — SIGNIFICANT CHANGE UP (ref 3.8–5.2)
RBC # FLD: 13 % — SIGNIFICANT CHANGE UP (ref 10.3–14.5)
WBC # BLD: 9.83 K/UL — SIGNIFICANT CHANGE UP (ref 3.8–10.5)
WBC # FLD AUTO: 9.83 K/UL — SIGNIFICANT CHANGE UP (ref 3.8–10.5)

## 2025-04-09 RX ORDER — CITRIC ACID/SODIUM CITRATE 300-500 MG
15 SOLUTION, ORAL ORAL EVERY 6 HOURS
Refills: 0 | Status: DISCONTINUED | OUTPATIENT
Start: 2025-04-09 | End: 2025-04-10

## 2025-04-09 RX ORDER — SODIUM CHLORIDE 9 G/1000ML
1000 INJECTION, SOLUTION INTRAVENOUS
Refills: 0 | Status: DISCONTINUED | OUTPATIENT
Start: 2025-04-09 | End: 2025-04-10

## 2025-04-09 RX ORDER — INFLUENZA A VIRUS A/IDAHO/07/2018 (H1N1) ANTIGEN (MDCK CELL DERIVED, PROPIOLACTONE INACTIVATED, INFLUENZA A VIRUS A/INDIANA/08/2018 (H3N2) ANTIGEN (MDCK CELL DERIVED, PROPIOLACTONE INACTIVATED), INFLUENZA B VIRUS B/SINGAPORE/INFTT-16-0610/2016 ANTIGEN (MDCK CELL DERIVED, PROPIOLACTONE INACTIVATED), INFLUENZA B VIRUS B/IOWA/06/2017 ANTIGEN (MDCK CELL DERIVED, PROPIOLACTONE INACTIVATED) 15; 15; 15; 15 UG/.5ML; UG/.5ML; UG/.5ML; UG/.5ML
0.5 INJECTION, SUSPENSION INTRAMUSCULAR ONCE
Refills: 0 | Status: DISCONTINUED | OUTPATIENT
Start: 2025-04-09 | End: 2025-04-11

## 2025-04-09 RX ORDER — OXYTOCIN-SODIUM CHLORIDE 0.9% IV SOLN 30 UNIT/500ML 30-0.9/5 UT/ML-%
167 SOLUTION INTRAVENOUS
Qty: 30 | Refills: 0 | Status: DISCONTINUED | OUTPATIENT
Start: 2025-04-09 | End: 2025-04-10

## 2025-04-09 RX ADMIN — SODIUM CHLORIDE 125 MILLILITER(S): 9 INJECTION, SOLUTION INTRAVENOUS at 22:50

## 2025-04-09 NOTE — OB RN PATIENT PROFILE - FUNCTIONAL ASSESSMENT - DAILY ACTIVITY SCORE HIDDEN
Minoxidil Pregnancy And Lactation Text: This medication has not been assigned a Pregnancy Risk Category but animal studies failed to show danger with the topical medication. It is unknown if the medication is excreted in breast milk. 24

## 2025-04-09 NOTE — OB PROVIDER H&P - NS_OBGYNHISTORY_OBGYN_ALL_OB_FT
OBHx: March 2022 FT VAVD 7lbs 9oz            Remote SAB no D&C  GYNHx: No ovarian cysts, fibroids, hx of abnormal pap or STDs

## 2025-04-09 NOTE — OB RN PATIENT PROFILE - NSICDXPASTMEDICALHX_GEN_ALL_CORE_FT
PAST MEDICAL HISTORY:  Miscarriage      (normal spontaneous vaginal delivery)      PAST MEDICAL HISTORY:  Previous baby delivered by vacuum extraction     Spontaneous miscarriage

## 2025-04-09 NOTE — OB PROVIDER H&P - NS_PRENATALLABSOURCEHEPATITISCPN_OBGYN_ALL_OB
CC:  Jackie Quiroz is here today for a medication check.      Chief Complaint   Patient presents with   • Medication Management   .      Medications: medications verified, no change    Refills needed today?  NO    denies Latex allergy or sensitivity    Tobacco history: verified    Health Maintenance Due   Topic Date Due   • Depression Screening  11/04/2004   • DTaP/Tdap/Td Vaccine (7 - Td) 06/24/2018   • Influenza Vaccine (1) 09/01/2020       Patient is due for topics as listed above but is not proceeding with Immunization(s) Dtap/Tdap/Td and Influenza at this time.                   
This visit is being performed via video to discuss No chief complaint on file.    Clinician Location: [facility] [place]    Jackie is in [place] and her identity has been established.   She was informed that consent to treat includes permission to submit charges to the applicable insurance on file. Jackie was advised regarding the potential risk inherent in video visits, as the assessment may be limited due to what can be seen on the screen which potentially results in an incomplete assessment; as well as either of us may discontinue the video visit if it is felt that the videoconferencing connections are not adequate for his/her situation.   5-10 minutes were spent in this encounter.    ASSESSMENT: Establishing care with new doctor, encounter for  (primary encounter diagnosis)  Comment:   Plan:     Recurrent cold sores  Comment:  Patient has recurrent cold sores.  She usually gets them when she has a cold or when she is stressed.  This has been going on a while now.  I have refilled her Valtrex  I have told her to come in and see me for physical  Plan:            
negative

## 2025-04-09 NOTE — OB PROVIDER H&P - HISTORY OF PRESENT ILLNESS
36 yo  @38wd reports to L&D endorsing........ Patient reports she was last found to be 1cm in the office this week. Patient otherwise feeels well, denies headache, visual changes, epigastric pain, CP, SOB, dysuria, hematuria. +FM, +CTX, -LOF, -VB.    PNC Uncomplicated   GBS   EFW: per sono g (/); extrapolated to g     OBHx:  GYNHx: No ovarian cysts, fibroids, hx of abnormal pap or STDs  PMHx: No hx of DM, HTN, asthma, bleeding or clotting disorders or blood transfusions.  PSurgHx: None  Allergies: NKDA  Meds: PNV q d               bASA 81mg q d  Social: No smoking, alcohol, or illicit drug use during pregnancy   Psych: No hx of anxiety or depression 34 yo  @38w0d reports to L&D for scheduled IOL of jacklyn TIUP. Patient reports she was last found to be 1cm in the office this week. Patient reports she was originally planning on a pLTCS as baby B was breech, however baby B was since found to be vertex@36 weeks. Patient otherwise feels well, denies headache, visual changes, epigastric pain, CP, SOB, dysuria, hematuria. +FM, -CTX, -LOF, -VB.    PNC Uncomplicated   GBS negative  EFW: Baby A: 6lbs 3oz, 2806g, per sono x2 weeks ago; extrapolated to 3200g           Baby B: 6lbs 5oz, 2886g, per sono x 2 weeks ago; extrapolated to 3275g    OBHx: 2022 FT VAVD 7lbs 9oz            Remote SAB no D&C  GYNHx: No ovarian cysts, fibroids, hx of abnormal pap or STDs  PMHx: No hx of DM, HTN, asthma, bleeding or clotting disorders or blood transfusions.  PSurgHx: None  Allergies: NKDA  Meds: PNV q d            bASA 81mg q d            PO Iron  Social: No smoking, alcohol, or illicit drug use during pregnancy   Psych: No hx of anxiety or depression

## 2025-04-09 NOTE — OB RN PATIENT PROFILE - FALL HARM RISK - CONCLUSION
EMERGENCY DEPARTMENT HISTORY AND PHYSICAL EXAM    2:39 PM      Date: 2/2/2019  Patient Name: Anibal Phan    History of Presenting Illness     Chief Complaint   Patient presents with    Facial Pain         History Provided By: Patient    Chief Complaint: Wound Check  Duration: 2 Days  Timing:  Intermittent  Location: N/A  Quality: Itching, burning  Severity: Mild  Modifying Factors: N/A  Associated Symptoms: denies any other associated signs or symptoms      Additional History (Context): Anibal Phan is a 73 y.o. female with No significant past medical history who presents with wanting to come in for wound check. Patient came in on 1/31/2019 for right sided facial swelling and rash. She notes that since her visit she has been taking abx, steroids and valtrex as prescribed. Notes improvement in swelling and pain. Patient said that she has been having some mild, intermittent, itching and burning. Denies fever/chills, eye pain, changes in vision. Denies any other associated signs or symptoms. No other concerns or symptoms at this time. PCP: Sybil Bassett NP    Current Outpatient Medications   Medication Sig Dispense Refill    doxycycline (MONODOX) 100 mg capsule Take 100 mg by mouth two (2) times a day.  OTHER       trimethoprim-sulfamethoxazole (BACTRIM DS) 160-800 mg per tablet Take 1 Tab by mouth two (2) times a day for 7 days. 14 Tab 0    valACYclovir (VALTREX) 1 gram tablet Take 1 Tab by mouth three (3) times daily for 7 days. 21 Tab 0    methylPREDNISolone (MEDROL, PAULETTE,) 4 mg tablet Use as prescribed by  1 Dose Pack 0       Past History     Past Medical History:  No past medical history on file. Past Surgical History:  Past Surgical History:   Procedure Laterality Date    HX BREAST BIOPSY Right     rt 1972  cyst       Family History:  No family history on file.     Social History:  Social History     Tobacco Use    Smoking status: Never Smoker    Smokeless tobacco: Never Used   Substance Use Topics    Alcohol use: Yes     Comment: one a week    Drug use: No       Allergies:  No Known Allergies      Review of Systems     Review of Systems   Constitutional: Negative for chills and fever. HENT: Positive for facial swelling. Respiratory: Negative for shortness of breath. Cardiovascular: Negative for chest pain. Gastrointestinal: Negative for abdominal pain, nausea and vomiting. Skin: Negative for rash. Wound check     Neurological: Negative for weakness. All other systems reviewed and are negative. Physical Exam     Visit Vitals  /79 (BP 1 Location: Right arm)   Pulse 86   Temp 98 °F (36.7 °C)   Resp 18   Ht 5' 3\" (1.6 m)   Wt 78.9 kg (174 lb)   SpO2 98%   Breastfeeding? No   BMI 30.82 kg/m²       Physical Exam   Constitutional: She is oriented to person, place, and time. She appears well-developed and well-nourished. No distress. HENT:   Head: Normocephalic and atraumatic. Mouth/Throat: Oropharynx is clear and moist.   No vesicles on the nasal bridge or ear canal   Mild edema to right maxillary region   Small abrasion above lip on R side of face   Eyes: Conjunctivae and EOM are normal. Pupils are equal, round, and reactive to light. No scleral icterus. No periorbital erythema or edema   Neck: Normal range of motion. Neck supple. Cardiovascular: Normal rate, regular rhythm and normal heart sounds. No murmur heard. Pulmonary/Chest: Effort normal and breath sounds normal. No respiratory distress. Abdominal: Soft. Bowel sounds are normal. She exhibits no distension. There is no tenderness. Musculoskeletal: She exhibits no edema. Lymphadenopathy:     She has no cervical adenopathy. Neurological: She is alert and oriented to person, place, and time. Coordination normal.   Skin: Skin is warm and dry. Abrasion (to the right eyebrow ) noted. No rash noted. Psychiatric: She has a normal mood and affect.  Her behavior is normal.   Nursing note and vitals reviewed. Diagnostic Study Results     Labs -  No results found for this or any previous visit (from the past 12 hour(s)). Radiologic Studies -   No orders to display         Medical Decision Making     It should be noted that Ashley GARCIA Dorian Gables will be the provider of record for this patient. I reviewed the vital signs, available nursing notes, past medical history, past surgical history, family history and social history. Vital Signs-Reviewed the patient's vital signs. Pulse Oximetry Analysis -  98% on room air     Cardiac Monitor:  Rate: 86 bpm    Records Reviewed: Nursing Notes and Old Medical Records (Time of Review: 2:39 PM)    ED Course: Progress Notes, Reevaluation, and Consults:  2:56 PM Reviewed plan with patient. Discussed completing medication as prescribed. Discussed need for close outpatient follow-up. Discussed strict return precautions, including fever, increased facial swelling, eye pain, changes in vision, or any other medical concerns. Provider Notes (Medical Decision Making): 74 yo F who presents for a wound check. Pt was evaluated on 1/31 and d/c with abx and anti-virals with likely diagnosis of shingles. Has been taking medicine as prescribed. Notes swelling has reduced. Afebrile, non-toxic appearing. Minimal edema of L maxillary region. No evidence of periorbital/orbital cellulitis. Will continue abx, anti-virals at home and follow-up with PMD this week. Will return for any new, worsening, or persistent symptoms. Diagnosis     Clinical Impression:   1. Herpes zoster without complication    2.  Facial swelling        Disposition: Discharged    Follow-up Information     Follow up With Specialties Details Why [facility] EMERGENCY DEPT Emergency Medicine  If symptoms worsen [address]    Mariann Moody NP Nurse Practitioner In 2 days  [address] [facility] [address]  [phone]                Medication List      ASK your doctor about these medications    doxycycline 100 mg capsule  Commonly known as:  MONODOX     methylPREDNISolone 4 mg tablet  Commonly known as:  MEDROL (PAULETTE)  Use as prescribed by      OTHER     trimethoprim-sulfamethoxazole 160-800 mg per tablet  Commonly known as:  BACTRIM DS  Take 1 Tab by mouth two (2) times a day for 7 days. valACYclovir 1 gram tablet  Commonly known as:  VALTREX  Take 1 Tab by mouth three (3) times daily for 7 days. _______________________________       Scribe Altagracia Coello acting as a scribe for and in the presence of Jesica Miller      February 02, 2019 at 2:39 PM       Provider Attestation:      I personally performed the services described in the documentation, reviewed the documentation, as recorded by the scribe in my presence, and it accurately and completely records my words and actions.  February 02, 2019 at 2:39 PM - Aminah Nesbitt PA-C         _______________________________ Universal Safety Interventions

## 2025-04-09 NOTE — OB PROVIDER H&P - NSHPPHYSICALEXAM_GEN_ALL_CORE
PE:  T(C): 36.9 (04-09-25 @ 23:43), Max: 36.9 (04-09-25 @ 22:19)  HR: 78 (04-10-25 @ 00:32) (77 - 96)  BP: 121/54 (04-09-25 @ 23:43) (121/54 - 146/70)  RR: 18 (04-09-25 @ 23:43) (18 - 18)  SpO2: 95% (04-10-25 @ 00:32) (95% - 100%)    General: NAD, A&Ox3  CV: Extremities well perfused on visual inspection  Lungs: Nonlabored breathing on RA  Abd: soft, nontender, gravid  VE: 1 / 50 / -3  BSUS: Vertex/ Vertex  EFM: Baby A: 120/moderate variablity/+accels/-decels          Baby B:  125/moderate variablity/+accels/-decels  TOCO: acontractile

## 2025-04-09 NOTE — OB PROVIDER H&P - ASSESSMENT
A: 36 yo  @38w0d admitted for IOL 2.2 di/di TIUP.    P:  - Admit to L&D  - Routine labs   - EFM/TOCO: Resuscitative measures PRN  - Clear liquid diet  - IVH  - Anesthesia consult   - Bicitra  - GBS negative  - Epi PRN  - PO cytotec for IOL, CB@5am  - Expect     Plan per Dr. Jessica Mir PA-C

## 2025-04-09 NOTE — OB RN PATIENT PROFILE - TEMPERATURE IN FAHRENHEIT (DEGREES F)
Patient : Brii Shin Jr. Age: 23 year old Sex: male   MRN: [id number] Encounter Date: 10/18/2023      History     Chief Complaint   Patient presents with   • Drug Overdose Intentional     HPI  Mr. Shin is a 23-year-old male presenting the ER with possible overdose.  Patient will not speak to myself or other staff members.  We are receiving history from the [facility] and poison control.  Patient apparently took 8 diphenhydramine tablets, 50 mg each.  This was apparently near 3:30 p.m..  No Known Allergies    Current Discharge Medication List      Prior to Admission Medications    Details   risperidone (RISPERDAL) 1 MG tablet Take 1 tablet by mouth 2 times daily.      guanFACINE (TENEX) 1 MG tablet Take 1 tablet by mouth nightly.      LamoTRIgine 100 MG TBDP Take  by mouth.             No past medical history on file.    No past surgical history on file.    No family history on file.    Social History     Tobacco Use   • Smoking status: Never   • Smokeless tobacco: Never   Substance Use Topics   • Alcohol use: No   • Drug use: No       E-cigarette/Vaping     E-Cigarette/Vaping Substances & Devices       Review of Systems   Unable to perform ROS: Other (Patient refusing to provide history initially)   Constitutional: Positive for activity change.   HENT: Negative.    Respiratory: Negative.    Cardiovascular: Negative.    Gastrointestinal: Negative.    Musculoskeletal: Negative.    Neurological: Negative.    Psychiatric/Behavioral: Negative.        Physical Exam     ED Triage Vitals   ED Triage Vitals Group      Temp 10/18/23 1655 99 °F (37.2 °C)      Heart Rate 10/18/23 1655 84      Resp 10/18/23 1655 16      BP 10/18/23 1655 132/79      SpO2 10/18/23 1655 99 %      EtCO2 mmHg --       Height 10/18/23 1655 5' 7\" (1.702 m)      Weight 10/18/23 1733 237 lb 10.5 oz (107.8 kg)      Weight Scale Used 10/18/23 1733 Scale in bed      BMI (Calculated) 10/18/23 1733 37.22      IBW/kg (Calculated) 10/18/23 1655 66.1       Physical  Exam  Vitals and nursing note reviewed. Exam conducted with a chaperone present.   Constitutional:       General: He is not in acute distress.     Appearance: Normal appearance. He is not ill-appearing.   HENT:      Mouth/Throat:      Mouth: Mucous membranes are moist.   Eyes:      Extraocular Movements: Extraocular movements intact.      Pupils: Pupils are equal, round, and reactive to light.      Comments: 4 mm Bilaterally    Cardiovascular:      Rate and Rhythm: Normal rate and regular rhythm.      Pulses: Normal pulses.      Heart sounds: Normal heart sounds.   Pulmonary:      Effort: Pulmonary effort is normal.      Breath sounds: Normal breath sounds.   Skin:     General: Skin is warm and dry.      Capillary Refill: Capillary refill takes less than 2 seconds.   Neurological:      Mental Status: He is alert and oriented to person, place, and time.         ED Course   5:45 PM:  Case discussed with [place] [facility].  Plan for at least 4 hour observation, diffuse refusing labs at least 12 hour observation as he has medication such as bupropion which she may have been exposed to.    6:01 PM:  I have reassessed the patient as he is refusing labs.  I spoke with the patient once again and he states when asked “no, I took many more pills than they are telling you that I did “.  He will not endorse what pills stating “someone in the [facility] gave them to me “.  He is refusing blood work to be done as he states that he does not like needles.  I have recommended labs to be done as I can not rule out further life-threatening pathology and there is unclear overdose at this time.    10:03 PM: Pt now agreeable to having labs drawn.    11:47 PM: Pt reassessed, resting comfortably at this time. Case discussed with poison control at this time. Plan for to watch until 3 AM.   Procedures    Lab Results     Results for orders placed or performed during the hospital encounter of 10/18/23   Alcohol   Result Value Ref Range     Alcohol None Detected None Detected mg/dL   Comprehensive Metabolic Panel   Result Value Ref Range    Fasting Status      Sodium 137 135 - 145 mmol/L    Potassium 3.6 3.4 - 5.1 mmol/L    Chloride 104 97 - 110 mmol/L    Carbon Dioxide 26 21 - 32 mmol/L    Anion Gap 11 7 - 19 mmol/L    Glucose 87 70 - 99 mg/dL    BUN 9 6 - 20 mg/dL    Creatinine 0.92 0.67 - 1.17 mg/dL    Glomerular Filtration Rate >90 >=60    BUN/Cr 10 7 - 25    Calcium 8.9 8.4 - 10.2 mg/dL    Bilirubin, Total 0.8 0.2 - 1.0 mg/dL    GOT/AST 15 <=37 Units/L    GPT/ALT 18 <64 Units/L    Alkaline Phosphatase 104 45 - 117 Units/L    Albumin 3.9 3.6 - 5.1 g/dL    Protein, Total 7.3 6.4 - 8.2 g/dL    Globulin 3.4 2.0 - 4.0 g/dL    A/G Ratio 1.1 1.0 - 2.4   Acetaminophen Level   Result Value Ref Range    Acetaminophen <2 (L) 10 - 30 mcg/mL   Salicylate Level   Result Value Ref Range    Salicylate <2.8 <=30.0 mg/dL   Drug Abuse Screen, Urine   Result Value Ref Range    Amphetamines, Urine Negative Negative    Barbiturates, Urine Negative Negative    Benzodiazepines, Urine Negative Negative    Cocaine/ Metabolite, Urine Negative Negative    Opiates, Urine Negative Negative    Phencyclidine, Urine Negative Negative    Cannabinoids, Urine Negative Negative    Fentanyl, Urine Screen Negative Negative   CBC with Automated Differential (performable only)   Result Value Ref Range    WBC 8.0 4.2 - 11.0 K/mcL    RBC 4.93 4.50 - 5.90 mil/mcL    HGB 15.0 13.0 - 17.0 g/dL    HCT 43.2 39.0 - 51.0 %    MCV 87.6 78.0 - 100.0 fl    MCH 30.4 26.0 - 34.0 pg    MCHC 34.7 32.0 - 36.5 g/dL    RDW-CV 11.6 11.0 - 15.0 %    RDW-SD 37.0 (L) 39.0 - 50.0 fL     140 - 450 K/mcL    NRBC 0 <=0 /100 WBC    Neutrophil, Percent 60 %    Lymphocytes, Percent 31 %    Mono, Percent 8 %    Eosinophils, Percent 1 %    Basophils, Percent 0 %    Immature Granulocytes 0 %    Absolute Neutrophils 4.8 1.8 - 7.7 K/mcL    Absolute Lymphocytes 2.5 1.0 - 4.8 K/mcL    Absolute Monocytes 0.6 0.3 -  0.9 K/mcL    Absolute Eosinophils  0.1 0.0 - 0.5 K/mcL    Absolute Basophils 0.0 0.0 - 0.3 K/mcL    Absolute Immature Granulocytes 0.0 0.0 - 0.2 K/mcL       EKG Results     EKG Interpretation  Rate: 83  Rhythm: normal sinus rhythm   Abnormality: no    EKG tracing interpreted by ED physician no prior on file    Radiology Results     Imaging Results    None         ED Medication Orders (From admission, onward)    None               MDM  23-year-old male presents emergency department from the long term.  We received history that the patient had taken 8 tablets of diphenhydramine 50 mg.  On arrival the patient was refusing to speak with me.  On reassessment the patient was able to speak with me and stated that he took medications that were handed to him.  He was not agreeable to have labs done until roughly 5 hours into his ER stay.  The case was discussed with [place] [facility] who recommended that the patient have at least 12 hour observation.  Patient's labs are grossly unremarkable including Tylenol, salicylate, metabolic panel and EKG.  Ultimately the patient will be observed until 3:00 a.m. which is roughly 12 hours from the onset of ingestion.  If his vital signs remained physiologic and he is asymptomatic he will be able to be cleared on close watch.  He denies suicidal ideation.  Clinical Impression     ED Diagnosis   1. Overdose of undetermined intent, initial encounter            Disposition        There is no disposition no dispo time  There is no comment       Justin Tong APNP  10/19/23 0157    0200 - Patient care assumed from MARA Armando for continued evaluation and care with pending completion of the 12 hour observation period as determined by Poison Center after reported ingestion of an unknown substance at approximately three p.m..  Plan is for observation until 03:00.    0300 - patient reassessed and resting comfortably in the room.  Vitals are unremarkable.  Labs largely  unremarkable as well.  He has no complaints or concerns at this time.  I discussed regarding plan to discharge back to [facility].  The appropriate paperwork was completed.  Encouraged follow-up and advised on warning signs and reasons to return.  Patient receptive.    Impression:  The encounter diagnosis was Overdose of undetermined intent, initial encounter.    Follow Up:  your PCP      Follow-up with your PCP for continued evaluation and care following this ED visit.    [facility]  [address] [place] [id number]  [phone]    Return to ED with worsening symptoms as needed.       Discharge Medication List as of 10/19/2023  3:08 AM          Pt is discharged in stable condition.         Stephenie Chiang PA-C  10/19/23 0322     98.4

## 2025-04-09 NOTE — OB RN PATIENT PROFILE - PRO RUBELLA INFANT

## 2025-04-10 LAB
ALBUMIN SERPL ELPH-MCNC: 3 G/DL — LOW (ref 3.3–5)
ALP SERPL-CCNC: 121 U/L — HIGH (ref 40–120)
ALT FLD-CCNC: 15 U/L — SIGNIFICANT CHANGE UP (ref 10–45)
ANION GAP SERPL CALC-SCNC: 13 MMOL/L — SIGNIFICANT CHANGE UP (ref 5–17)
AST SERPL-CCNC: 38 U/L — SIGNIFICANT CHANGE UP (ref 10–40)
BASOPHILS # BLD AUTO: 0.03 K/UL — SIGNIFICANT CHANGE UP (ref 0–0.2)
BASOPHILS NFR BLD AUTO: 0.2 % — SIGNIFICANT CHANGE UP (ref 0–2)
BILIRUB SERPL-MCNC: 0.7 MG/DL — SIGNIFICANT CHANGE UP (ref 0.2–1.2)
BUN SERPL-MCNC: 7 MG/DL — SIGNIFICANT CHANGE UP (ref 7–23)
CALCIUM SERPL-MCNC: 8.3 MG/DL — LOW (ref 8.4–10.5)
CHLORIDE SERPL-SCNC: 103 MMOL/L — SIGNIFICANT CHANGE UP (ref 96–108)
CO2 SERPL-SCNC: 18 MMOL/L — LOW (ref 22–31)
CREAT SERPL-MCNC: 0.78 MG/DL — SIGNIFICANT CHANGE UP (ref 0.5–1.3)
EGFR: 102 ML/MIN/1.73M2 — SIGNIFICANT CHANGE UP
EGFR: 102 ML/MIN/1.73M2 — SIGNIFICANT CHANGE UP
EOSINOPHIL # BLD AUTO: 0.02 K/UL — SIGNIFICANT CHANGE UP (ref 0–0.5)
EOSINOPHIL NFR BLD AUTO: 0.1 % — SIGNIFICANT CHANGE UP (ref 0–6)
GLUCOSE SERPL-MCNC: 93 MG/DL — SIGNIFICANT CHANGE UP (ref 70–99)
HCT VFR BLD CALC: 38 % — SIGNIFICANT CHANGE UP (ref 34.5–45)
HGB BLD-MCNC: 12.6 G/DL — SIGNIFICANT CHANGE UP (ref 11.5–15.5)
IMM GRANULOCYTES NFR BLD AUTO: 0.7 % — SIGNIFICANT CHANGE UP (ref 0–0.9)
LDH SERPL L TO P-CCNC: 197 U/L — SIGNIFICANT CHANGE UP (ref 50–242)
LYMPHOCYTES # BLD AUTO: 1.57 K/UL — SIGNIFICANT CHANGE UP (ref 1–3.3)
LYMPHOCYTES # BLD AUTO: 10.1 % — LOW (ref 13–44)
MCHC RBC-ENTMCNC: 29.9 PG — SIGNIFICANT CHANGE UP (ref 27–34)
MCHC RBC-ENTMCNC: 33.2 G/DL — SIGNIFICANT CHANGE UP (ref 32–36)
MCV RBC AUTO: 90 FL — SIGNIFICANT CHANGE UP (ref 80–100)
MONOCYTES # BLD AUTO: 0.86 K/UL — SIGNIFICANT CHANGE UP (ref 0–0.9)
MONOCYTES NFR BLD AUTO: 5.5 % — SIGNIFICANT CHANGE UP (ref 2–14)
NEUTROPHILS # BLD AUTO: 13.03 K/UL — HIGH (ref 1.8–7.4)
NEUTROPHILS NFR BLD AUTO: 83.4 % — HIGH (ref 43–77)
NRBC BLD AUTO-RTO: 0 /100 WBCS — SIGNIFICANT CHANGE UP (ref 0–0)
PLATELET # BLD AUTO: 130 K/UL — LOW (ref 150–400)
POTASSIUM SERPL-MCNC: 3.6 MMOL/L — SIGNIFICANT CHANGE UP (ref 3.5–5.3)
POTASSIUM SERPL-SCNC: 3.6 MMOL/L — SIGNIFICANT CHANGE UP (ref 3.5–5.3)
PROT SERPL-MCNC: 5.6 G/DL — LOW (ref 6–8.3)
RBC # BLD: 4.22 M/UL — SIGNIFICANT CHANGE UP (ref 3.8–5.2)
RBC # FLD: 12.9 % — SIGNIFICANT CHANGE UP (ref 10.3–14.5)
SODIUM SERPL-SCNC: 134 MMOL/L — LOW (ref 135–145)
T PALLIDUM AB TITR SER: NEGATIVE — SIGNIFICANT CHANGE UP
URATE SERPL-MCNC: 5.7 MG/DL — SIGNIFICANT CHANGE UP (ref 2.5–7)
WBC # BLD: 15.62 K/UL — HIGH (ref 3.8–10.5)
WBC # FLD AUTO: 15.62 K/UL — HIGH (ref 3.8–10.5)

## 2025-04-10 PROCEDURE — 88307 TISSUE EXAM BY PATHOLOGIST: CPT | Mod: 26

## 2025-04-10 RX ORDER — OXYTOCIN-SODIUM CHLORIDE 0.9% IV SOLN 30 UNIT/500ML 30-0.9/5 UT/ML-%
2 SOLUTION INTRAVENOUS
Qty: 30 | Refills: 0 | Status: DISCONTINUED | OUTPATIENT
Start: 2025-04-10 | End: 2025-04-11

## 2025-04-10 RX ORDER — DIPHENHYDRAMINE HCL 12.5MG/5ML
25 ELIXIR ORAL EVERY 6 HOURS
Refills: 0 | Status: DISCONTINUED | OUTPATIENT
Start: 2025-04-10 | End: 2025-04-11

## 2025-04-10 RX ORDER — HYDROCORTISONE 10 MG/G
1 CREAM TOPICAL EVERY 6 HOURS
Refills: 0 | Status: DISCONTINUED | OUTPATIENT
Start: 2025-04-10 | End: 2025-04-11

## 2025-04-10 RX ORDER — BENZOCAINE 220 MG/G
1 SPRAY, METERED PERIODONTAL EVERY 6 HOURS
Refills: 0 | Status: DISCONTINUED | OUTPATIENT
Start: 2025-04-10 | End: 2025-04-11

## 2025-04-10 RX ORDER — SODIUM CHLORIDE 9 G/1000ML
1000 INJECTION, SOLUTION INTRAVENOUS ONCE
Refills: 0 | Status: DISCONTINUED | OUTPATIENT
Start: 2025-04-10 | End: 2025-04-11

## 2025-04-10 RX ORDER — OXYCODONE HYDROCHLORIDE 30 MG/1
5 TABLET ORAL
Refills: 0 | Status: DISCONTINUED | OUTPATIENT
Start: 2025-04-10 | End: 2025-04-11

## 2025-04-10 RX ORDER — IBUPROFEN 200 MG
600 TABLET ORAL EVERY 6 HOURS
Refills: 0 | Status: DISCONTINUED | OUTPATIENT
Start: 2025-04-10 | End: 2025-04-11

## 2025-04-10 RX ORDER — CLOSTRIDIUM TETANI TOXOID ANTIGEN (FORMALDEHYDE INACTIVATED), CORYNEBACTERIUM DIPHTHERIAE TOXOID ANTIGEN (FORMALDEHYDE INACTIVATED), BORDETELLA PERTUSSIS TOXOID ANTIGEN (GLUTARALDEHYDE INACTIVATED), BORDETELLA PERTUSSIS FILAMENTOUS HEMAGGLUTININ ANTIGEN (FORMALDEHYDE INACTIVATED), BORDETELLA PERTUSSIS PERTACTIN ANTIGEN, AND BORDETELLA PERTUSSIS FIMBRIAE 2/3 ANTIGEN 5; 2; 2.5; 5; 3; 5 [LF]/.5ML; [LF]/.5ML; UG/.5ML; UG/.5ML; UG/.5ML; UG/.5ML
0.5 INJECTION, SUSPENSION INTRAMUSCULAR ONCE
Refills: 0 | Status: DISCONTINUED | OUTPATIENT
Start: 2025-04-10 | End: 2025-04-11

## 2025-04-10 RX ORDER — MAGNESIUM HYDROXIDE 400 MG/5ML
30 SUSPENSION ORAL
Refills: 0 | Status: DISCONTINUED | OUTPATIENT
Start: 2025-04-10 | End: 2025-04-11

## 2025-04-10 RX ORDER — DIBUCAINE 10 MG/G
1 OINTMENT TOPICAL EVERY 6 HOURS
Refills: 0 | Status: DISCONTINUED | OUTPATIENT
Start: 2025-04-10 | End: 2025-04-11

## 2025-04-10 RX ORDER — SIMETHICONE 80 MG
80 TABLET,CHEWABLE ORAL EVERY 4 HOURS
Refills: 0 | Status: DISCONTINUED | OUTPATIENT
Start: 2025-04-10 | End: 2025-04-11

## 2025-04-10 RX ORDER — IBUPROFEN 200 MG
600 TABLET ORAL EVERY 6 HOURS
Refills: 0 | Status: COMPLETED | OUTPATIENT
Start: 2025-04-10 | End: 2026-03-09

## 2025-04-10 RX ORDER — OXYTOCIN-SODIUM CHLORIDE 0.9% IV SOLN 30 UNIT/500ML 30-0.9/5 UT/ML-%
10 SOLUTION INTRAVENOUS ONCE
Refills: 0 | Status: DISCONTINUED | OUTPATIENT
Start: 2025-04-10 | End: 2025-04-10

## 2025-04-10 RX ORDER — KETOROLAC TROMETHAMINE 30 MG/ML
30 INJECTION, SOLUTION INTRAMUSCULAR; INTRAVENOUS ONCE
Refills: 0 | Status: DISCONTINUED | OUTPATIENT
Start: 2025-04-10 | End: 2025-04-10

## 2025-04-10 RX ORDER — WITCH HAZEL LEAF
1 FLUID EXTRACT MISCELLANEOUS EVERY 4 HOURS
Refills: 0 | Status: DISCONTINUED | OUTPATIENT
Start: 2025-04-10 | End: 2025-04-11

## 2025-04-10 RX ORDER — PRAMOXINE HCL 1 %
1 GEL (GRAM) TOPICAL EVERY 4 HOURS
Refills: 0 | Status: DISCONTINUED | OUTPATIENT
Start: 2025-04-10 | End: 2025-04-11

## 2025-04-10 RX ORDER — PRENATAL 136/IRON/FOLIC ACID 27 MG-1 MG
1 TABLET ORAL DAILY
Refills: 0 | Status: DISCONTINUED | OUTPATIENT
Start: 2025-04-10 | End: 2025-04-11

## 2025-04-10 RX ORDER — OXYCODONE HYDROCHLORIDE 30 MG/1
5 TABLET ORAL ONCE
Refills: 0 | Status: DISCONTINUED | OUTPATIENT
Start: 2025-04-10 | End: 2025-04-11

## 2025-04-10 RX ORDER — ACETAMINOPHEN 500 MG/5ML
975 LIQUID (ML) ORAL
Refills: 0 | Status: DISCONTINUED | OUTPATIENT
Start: 2025-04-10 | End: 2025-04-11

## 2025-04-10 RX ORDER — OXYTOCIN-SODIUM CHLORIDE 0.9% IV SOLN 30 UNIT/500ML 30-0.9/5 UT/ML-%
167 SOLUTION INTRAVENOUS
Qty: 30 | Refills: 0 | Status: DISCONTINUED | OUTPATIENT
Start: 2025-04-10 | End: 2025-04-11

## 2025-04-10 RX ORDER — MODIFIED LANOLIN 100 %
1 CREAM (GRAM) TOPICAL EVERY 6 HOURS
Refills: 0 | Status: DISCONTINUED | OUTPATIENT
Start: 2025-04-10 | End: 2025-04-11

## 2025-04-10 RX ADMIN — Medication 600 MILLIGRAM(S): at 23:04

## 2025-04-10 RX ADMIN — SODIUM CHLORIDE 125 MILLILITER(S): 9 INJECTION, SOLUTION INTRAVENOUS at 07:47

## 2025-04-10 RX ADMIN — Medication 0.2 MILLIGRAM(S): at 15:34

## 2025-04-10 RX ADMIN — Medication 975 MILLIGRAM(S): at 21:40

## 2025-04-10 RX ADMIN — OXYTOCIN-SODIUM CHLORIDE 0.9% IV SOLN 30 UNIT/500ML 2 MILLIUNIT(S)/MIN: 30-0.9/5 SOLUTION at 06:05

## 2025-04-10 RX ADMIN — Medication 975 MILLIGRAM(S): at 21:10

## 2025-04-10 RX ADMIN — Medication 600 MILLIGRAM(S): at 23:34

## 2025-04-10 RX ADMIN — KETOROLAC TROMETHAMINE 30 MILLIGRAM(S): 30 INJECTION, SOLUTION INTRAMUSCULAR; INTRAVENOUS at 16:50

## 2025-04-10 NOTE — OB NEONATOLOGY/PEDIATRICIAN DELIVERY SUMMARY - NSPEDSNEONOTESB_OBGYN_ALL_OB_FT
Requested by OB to attend Vaginal delivery for di-di twin delivery. 38.1 week old infant born to a 35 y/o  mother. Maternal labs: Blood Type; O neg (reported to not receive Rhogam because dad is Rh negative), Hep B negative, Hep C negative, Rubella Immune, RPR Negative, HIV negative, GBS Negative (/). Maternal PMHX includes: Vaccuum assisted vaginal delivery. Prenatal course uncomplicated. AROM at 1323, clear fluids. infant cephalic emerged crying and with good tone. Delayed Cord Clamping done x 1 minute. Baby brought to warmer and was dried, warmed, suctioned, and stimulated. Hat was placed. Apgars 9/9. Mom wants to feed breast-milk and formula, wants Hep B, wants circ. EOS Score=0.08.

## 2025-04-10 NOTE — OB PROVIDER LABOR PROGRESS NOTE - NS_SUBJECTIVE/OBJECTIVE_OBGYN_ALL_OB_FT
Patient re-examined for progress    T(C): 36.8 (04-10-25 @ 06:37), Max: 36.9 (04-09-25 @ 22:19)  HR: 70 (04-10-25 @ 08:30) (65 - 103)  BP: 128/80 (04-10-25 @ 08:07) (121/54 - 146/70)  RR: 18 (04-10-25 @ 06:37) (18 - 18)  SpO2: 99% (04-10-25 @ 08:30) (92% - 100%)
pt due for exam

## 2025-04-10 NOTE — OB RN DELIVERY SUMMARY - NSSELHIDDEN_OBGYN_ALL_OB_FT
[NS_DeliveryAttending1_OBGYN_ALL_OB_FT:[id number]=],[NS_DeliveryRN_OBGYN_ALL_OB_FT:[id number]=],[NS_CirculateRN2_OBGYN_ALL_OB_FT:[id number]] [NS_DeliveryAttending1_OBGYN_ALL_OB_FT:[id number]=],[NS_DeliveryRN_OBGYN_ALL_OB_FT:[id number]=],[NS_CirculateRN2_OBGYN_ALL_OB_FT:[id number]=],[NS_DeliveryAssist1_OBGYN_ALL_OB_FT:[id number]=]

## 2025-04-10 NOTE — OB PROVIDER LABOR PROGRESS NOTE - ASSESSMENT
pt tolerated exam well    - s/p PO x2 doses of 20mcg  - start pitocin 2x2  - defer CB at this time, reconsider if exam unchanged in 3hrs    Amyeo Afroz Jereen, PGY-4  d/w Dr Lopez pt tolerated exam well    - Baby B with intermittent Cat2 tracing  - s/p PO x2 doses of 20mcg  - start pitocin 2x2  - defer CB at this time, reconsider if exam unchanged in 3hrs    Amyeo Afroz Jereen, PGY-4  d/w Dr Lopez

## 2025-04-10 NOTE — OB PROVIDER DELIVERY SUMMARY - NSPROVIDERDELIVERYNOTE_OBGYN_ALL_OB_FT
Spontaneous vaginal delivery of liveborn di-di twin intrauterine pregnancy.   Baby A, male, delivered spontaneously from OA position. Head, shoulders, and body delivered easily. Infant was suctioned. No mec. Delayed cord clamping was performed x 60seconds. Cord was clamped and cut and infant was passed to mother then peds. Apgars 9/9.   After delivery of Baby A, Bedside US performed showing Baby B in vertex position. AROM performed, clear fluid. Pt was then informed to start pushing with contractions as Baby B had a prolonged decel on FHT.   Baby B, female, delivered from ROP position. Head, shoulders, and body delivered easily. Infant was suctioned. No mec. Delayed cord clamping was performed x 60seconds. Cord was clamped and cut and infant was passed to mother then peds. Apgars 9/9.   Placenta delivered intact with a 3 vessel cord. Fundal massage was given and uterine fundus was found to be firm. Vaginal exam revealed an intact cervix. Patient had a right sulcal tear and a 2nd degree laceration in the perineum that was repaired with 2.0 vicryl suture. Excellent hemostasis was noted. Patient was stable. Count was correct x 2. EBL 400ccs.    Delivered with Dr. Cho, attending physician  Arely Paez, PGY1

## 2025-04-10 NOTE — OB NEONATOLOGY/PEDIATRICIAN DELIVERY SUMMARY - NSPEDSNEONOTESA_OBGYN_ALL_OB_FT
Requested by OB to attend Vaginal delivery for di-di twin delivery. 38.1 week old infant born to a 35 y/o  mother. Maternal labs: Blood Type; O neg (reported to not receive Rhogam because dad is Rh negative), Hep B negative, Hep C negative, Rubella Immune, RPR Negative, HIV negative, GBS Negative (). Maternal PMHX includes: Vaccuum assisted vaginal delivery. Prenatal course uncomplicated. AROM at 0932, clear fluids. Arrived at 20 seconds of life, infant crying and with good tone. Delayed Cord Clamping done x 45 seconds. Baby brought to warmer and was dried, warmed, suctioned, and stimulated. Hat was placed. Apgars 9/9. Mom wants to feed breast-milk and formula. EOS Score=0.12.

## 2025-04-10 NOTE — OB PROVIDER LABOR PROGRESS NOTE - NS_OBIHIFHRDETAILS_OBGYN_ALL_OB_FT
cat1 Baby A 125/mod/+accels no decels Baby B 120/mod/+accels no decels
Baby A: Cat 1; 120/ mod variability/ + accels/ - decels  Baby B: Cat 1; 130/ mod variability/ + accels/ - decels

## 2025-04-10 NOTE — OB PROVIDER DELIVERY SUMMARY - NSSELHIDDEN_OBGYN_ALL_OB_FT
[NS_DeliveryAttending1_OBGYN_ALL_OB_FT:[id number]],[NS_DeliveryRN_OBGYN_ALL_OB_FT:[id number]],[NS_CirculateRN2_OBGYN_ALL_OB_FT:[id number]=],[NS_DeliveryAssist1_OBGYN_ALL_OB_FT:[id number]=]

## 2025-04-10 NOTE — OB PROVIDER LABOR PROGRESS NOTE - ASSESSMENT
36yo  @38+1 IOL for di/di TIUP    - SVE: 3.5/50/-3, intact  - FHT Cat 1 x2  - c/w pitocin. Currently at 6u    d/w Dr. Jessica Dan, PGY4

## 2025-04-10 NOTE — OB RN DELIVERY SUMMARY - NS_SEPSISRSKCALC_OBGYN_ALL_OB_FT
EOS calculated successfully. EOS Risk Factor: 0.5/1000 live births ([facility] national incidence); GA=38w1d; Temp=98.42; ROM=5.717; GBS='Negative'; Antibiotics='No antibiotics or any antibiotics < 2 hrs prior to birth'

## 2025-04-11 ENCOUNTER — TRANSCRIPTION ENCOUNTER (OUTPATIENT)
Age: 35
End: 2025-04-11

## 2025-04-11 VITALS
SYSTOLIC BLOOD PRESSURE: 117 MMHG | HEART RATE: 82 BPM | RESPIRATION RATE: 18 BRPM | OXYGEN SATURATION: 98 % | TEMPERATURE: 98 F | DIASTOLIC BLOOD PRESSURE: 82 MMHG

## 2025-04-11 PROCEDURE — 59050 FETAL MONITOR W/REPORT: CPT

## 2025-04-11 PROCEDURE — 83615 LACTATE (LD) (LDH) ENZYME: CPT

## 2025-04-11 PROCEDURE — 86780 TREPONEMA PALLIDUM: CPT

## 2025-04-11 PROCEDURE — 80053 COMPREHEN METABOLIC PANEL: CPT

## 2025-04-11 PROCEDURE — 86850 RBC ANTIBODY SCREEN: CPT

## 2025-04-11 PROCEDURE — 88307 TISSUE EXAM BY PATHOLOGIST: CPT

## 2025-04-11 PROCEDURE — 86901 BLOOD TYPING SEROLOGIC RH(D): CPT

## 2025-04-11 PROCEDURE — 86900 BLOOD TYPING SEROLOGIC ABO: CPT

## 2025-04-11 PROCEDURE — 85025 COMPLETE CBC W/AUTO DIFF WBC: CPT

## 2025-04-11 PROCEDURE — 84550 ASSAY OF BLOOD/URIC ACID: CPT

## 2025-04-11 RX ORDER — ACETAMINOPHEN 500 MG/5ML
3 LIQUID (ML) ORAL
Qty: 0 | Refills: 0 | DISCHARGE
Start: 2025-04-11

## 2025-04-11 RX ORDER — IBUPROFEN 200 MG
1 TABLET ORAL
Qty: 0 | Refills: 0 | DISCHARGE
Start: 2025-04-11

## 2025-04-11 RX ADMIN — Medication 1 TABLET(S): at 12:38

## 2025-04-11 RX ADMIN — Medication 975 MILLIGRAM(S): at 02:02

## 2025-04-11 RX ADMIN — Medication 600 MILLIGRAM(S): at 05:50

## 2025-04-11 RX ADMIN — Medication 600 MILLIGRAM(S): at 13:08

## 2025-04-11 RX ADMIN — Medication 600 MILLIGRAM(S): at 05:20

## 2025-04-11 RX ADMIN — Medication 975 MILLIGRAM(S): at 06:15

## 2025-04-11 RX ADMIN — Medication 600 MILLIGRAM(S): at 12:38

## 2025-04-11 RX ADMIN — Medication 975 MILLIGRAM(S): at 15:45

## 2025-04-11 RX ADMIN — Medication 975 MILLIGRAM(S): at 09:42

## 2025-04-11 RX ADMIN — Medication 975 MILLIGRAM(S): at 02:32

## 2025-04-11 RX ADMIN — Medication 975 MILLIGRAM(S): at 09:12

## 2025-04-11 NOTE — DISCHARGE NOTE OB - CARE PLAN
1 Principal Discharge DX:	 (normal spontaneous vaginal delivery)  Assessment and plan of treatment:	After discharge, please stay on pelvic rest for 6 weeks, meaning no sexual intercourse, no tampons and no douching.  No driving for 2 weeks as women can loose a lot of blood during delivery and there is a possibility of being lightheaded/fainting.  No lifting objects heavier than baby for two weeks.  Expect to have vaginal bleeding/spotting for up to six weeks.  The bleeding should get lighter and more white/light brown with time.  For bleeding soaking more than a pad an hour or passing clots greater than the size of your fist, come in to the emergency department.    Follow up in the office in 6 weeks     You can take up to 600mg Ibuprofen every 6 hours and/or tylenol 1000mg every 6 hours. Alternate medications every 3 hours (Take Ibuprofen in the morning and then tylenol 3 hours later)

## 2025-04-11 NOTE — PROGRESS NOTE ADULT - ASSESSMENT
Assessment:   36yo now postpartum day 1 from a , recovering well.     Plan:   - Continue scheduled Ibuprofen and Acetaminophen for pain, Oxycodone available PRN for breakthrough pain.  - Increase ambulation, SCDs when not ambulating  - Continue regular diet    Amyeo Jereen, PGY-4

## 2025-04-11 NOTE — DISCHARGE NOTE OB - CARE PROVIDER_API CALL
Leighton Cho  Obstetrics and Gynecology  [address]  [place] [address]  Phone: [phone]  Fax: [phone]  Follow Up Time:

## 2025-04-11 NOTE — PROGRESS NOTE ADULT - SUBJECTIVE AND OBJECTIVE BOX
OB Postpartum Progress Note: PPD #1     34yo now PPD #1 after  seen and examined at bedside, no acute overnight events. Patient denies current complaints, her pain is well controlled. States she is ambulating, voiding spontaneously, passing gas, and tolerating regular diet. Denies CP, SOB, N/V, HA, blurred vision, epigastric pain.    Vital Signs Last 24 Hours  T(C): 36.5 (25 @ 05:58), Max: 37.1 (04-10-25 @ 18:20)  HR: 73 (25 @ 05:58) (65 - 109)  BP: 119/79 (25 @ 05:58) (115/76 - 152/88)  RR: 18 (25 @ 05:58) (17 - 18)  SpO2: 98% (25 @ 05:58) (86% - 100%)    Physical Exam:  General: NAD, resting comfortably in bed   Abdomen: Soft, non-tender, non-distended, fundus firm  Extremities: Full ROM, moving all extremities spontaneously  Pelvic: Lochia wnl    Labs:    Blood Type: O Negative  Antibody Screen: Negative  RPR: Negative               12.6   15.62 )-----------( 130      ( 04-10 @ 18:45 )             38.0                12.9   9.83  )-----------( 144      ( 09 @ 23:32 )             38.0         MEDICATIONS  (STANDING):  acetaminophen     Tablet .. 975 milliGRAM(s) Oral <User Schedule>  diphtheria/tetanus/pertussis (acellular) Vaccine (Adacel) 0.5 milliLiter(s) IntraMuscular once  ibuprofen  Tablet. 600 milliGRAM(s) Oral every 6 hours  influenza   Vaccine 0.5 milliLiter(s) IntraMuscular once  lactated ringers Bolus 1000 milliLiter(s) IV Bolus once  oxytocin Infusion 167 milliUNIT(s)/Min (167 mL/Hr) IV Continuous <Continuous>  oxytocin Infusion. 2 milliUNIT(s)/Min (2 mL/Hr) IV Continuous <Continuous>  prenatal multivitamin 1 Tablet(s) Oral daily  sodium chloride 0.9% lock flush 3 milliLiter(s) IV Push every 8 hours    MEDICATIONS  (PRN):  benzocaine 20%/menthol 0.5% Spray 1 Spray(s) Topical every 6 hours PRN for Perineal discomfort  dibucaine 1% Ointment 1 Application(s) Topical every 6 hours PRN Perineal discomfort  diphenhydrAMINE 25 milliGRAM(s) Oral every 6 hours PRN Pruritus  hydrocortisone 1% Cream 1 Application(s) Topical every 6 hours PRN Moderate Pain (4-6)  lanolin Ointment 1 Application(s) Topical every 6 hours PRN nipple soreness  magnesium hydroxide Suspension 30 milliLiter(s) Oral two times a day PRN Constipation  oxyCODONE    IR 5 milliGRAM(s) Oral every 3 hours PRN Moderate to Severe Pain (4-10)  oxyCODONE    IR 5 milliGRAM(s) Oral once PRN Moderate to Severe Pain (4-10)  pramoxine 1%/zinc 5% Cream 1 Application(s) Topical every 4 hours PRN Moderate Pain (4-6)  simethicone 80 milliGRAM(s) Chew every 4 hours PRN Gas  witch hazel Pads 1 Application(s) Topical every 4 hours PRN Perineal discomfort

## 2025-04-11 NOTE — DISCHARGE NOTE OB - PATIENT PORTAL LINK FT
You can access the FollowMyHealth Patient Portal offered by [facility] by registering at the following website: [email or web address]. By joining [employer]’s FollowMyHealth portal, you will also be able to view your health information using other applications (apps) compatible with our system.

## 2025-04-11 NOTE — LACTATION INITIAL EVALUATION - LACTATION INTERVENTIONS
setup breast pump at moms request. Recommended 20/21mm flanges for pump.  Mom planning breast and bottle feeding.  Encouraged to offer both breasts prior to formula supplementation and/or to pump if desired./initiate/review hand expression/initiate/review pumping guidelines and safe milk handling/initiate/review techniques for position and latch/post discharge community resources provided/initiate/review supplementation plan due to medical indications/review techniques to increase milk supply/review techniques to manage sore nipples/engorgement/initiate/review breast massage/compression/reviewed components of an effective feeding and at least 8 effective feedings per day required/reviewed importance of monitoring infant diapers, the breastfeeding log, and minimum output each day/reviewed feeding on demand/by cue at least 8 times a day/recommended follow-up with pediatrician within 24 hours of discharge/reviewed indications of inadequate milk transfer that would require supplementation

## 2025-04-11 NOTE — DISCHARGE NOTE OB - MATERIALS PROVIDED
[facility] Screening Program/Breastfeeding Log/Breastfeeding Mother’s Support Group Information/Guide to Postpartum Care/Discharge Medication Information for Patients and Families Pocket Guide

## 2025-04-11 NOTE — DISCHARGE NOTE OB - FINANCIAL ASSISTANCE
[facility] provides services at a reduced cost to those who are determined to be eligible through [facility]’s financial assistance program. Information regarding [facility]’s financial assistance program can be found by going to [email or web address] or by calling [phone].

## 2025-04-17 LAB — SURGICAL PATHOLOGY STUDY: SIGNIFICANT CHANGE UP
